# Patient Record
Sex: FEMALE | Race: BLACK OR AFRICAN AMERICAN | NOT HISPANIC OR LATINO | URBAN - METROPOLITAN AREA
[De-identification: names, ages, dates, MRNs, and addresses within clinical notes are randomized per-mention and may not be internally consistent; named-entity substitution may affect disease eponyms.]

---

## 2018-11-05 ENCOUNTER — OUTPATIENT (OUTPATIENT)
Dept: EMERGENCY DEPT | Facility: HOSPITAL | Age: 38
LOS: 1 days | End: 2018-11-05
Payer: COMMERCIAL

## 2018-11-05 VITALS
DIASTOLIC BLOOD PRESSURE: 63 MMHG | HEIGHT: 62.5 IN | SYSTOLIC BLOOD PRESSURE: 103 MMHG | WEIGHT: 143.96 LBS | RESPIRATION RATE: 20 BRPM | OXYGEN SATURATION: 100 % | TEMPERATURE: 98 F | HEART RATE: 88 BPM

## 2018-11-05 LAB
APPEARANCE UR: CLEAR — SIGNIFICANT CHANGE UP
BILIRUB UR-MCNC: NEGATIVE — SIGNIFICANT CHANGE UP
COLOR SPEC: YELLOW — SIGNIFICANT CHANGE UP
DIFF PNL FLD: ABNORMAL
GLUCOSE UR QL: NEGATIVE — SIGNIFICANT CHANGE UP
KETONES UR-MCNC: NEGATIVE — SIGNIFICANT CHANGE UP
LEUKOCYTE ESTERASE UR-ACNC: NEGATIVE — SIGNIFICANT CHANGE UP
NITRITE UR-MCNC: NEGATIVE — SIGNIFICANT CHANGE UP
PH UR: 6.5 — SIGNIFICANT CHANGE UP (ref 5–8)
PROT UR-MCNC: NEGATIVE MG/DL — SIGNIFICANT CHANGE UP
SP GR SPEC: <=1.005 — SIGNIFICANT CHANGE UP (ref 1–1.03)
UROBILINOGEN FLD QL: 0.2 E.U./DL — SIGNIFICANT CHANGE UP

## 2018-11-05 PROCEDURE — 81001 URINALYSIS AUTO W/SCOPE: CPT

## 2018-11-05 PROCEDURE — 96360 HYDRATION IV INFUSION INIT: CPT

## 2018-11-05 PROCEDURE — 99283 EMERGENCY DEPT VISIT LOW MDM: CPT

## 2018-11-05 PROCEDURE — 76805 OB US >/= 14 WKS SNGL FETUS: CPT

## 2018-11-05 PROCEDURE — 87661 TRICHOMONAS VAGINALIS AMPLIF: CPT

## 2018-11-05 PROCEDURE — 87086 URINE CULTURE/COLONY COUNT: CPT

## 2018-11-05 PROCEDURE — 76817 TRANSVAGINAL US OBSTETRIC: CPT

## 2018-11-05 PROCEDURE — 87798 DETECT AGENT NOS DNA AMP: CPT

## 2018-11-05 PROCEDURE — 94760 N-INVAS EAR/PLS OXIMETRY 1: CPT

## 2018-11-05 PROCEDURE — 87801 DETECT AGNT MULT DNA AMPLI: CPT

## 2018-11-05 RX ORDER — PROGESTERONE 200 MG/1
1 CAPSULE, LIQUID FILLED ORAL
Qty: 30 | Refills: 5 | OUTPATIENT
Start: 2018-11-05 | End: 2019-05-03

## 2018-11-05 RX ORDER — SODIUM CHLORIDE 9 MG/ML
1000 INJECTION, SOLUTION INTRAVENOUS ONCE
Qty: 0 | Refills: 0 | Status: DISCONTINUED | OUTPATIENT
Start: 2018-11-05 | End: 2018-11-05

## 2018-11-05 NOTE — ED ADULT TRIAGE NOTE - OTHER COMPLAINTS
, CC of abdominal cramps R, intermittently, (-) radiation , CC of abdominal cramps R since 0230H, intermittently, (-) radiation , CC of abdominal cramps R since 0230H, intermittently, (-) radiation, denies any vaginal discharge and still feels fetal movement.

## 2018-11-06 LAB
CULTURE RESULTS: SIGNIFICANT CHANGE UP
SPECIMEN SOURCE: SIGNIFICANT CHANGE UP

## 2018-11-09 LAB
A VAGINAE DNA VAG QL NAA+PROBE: ABNORMAL
BVAB2 DNA VAG QL NAA+PROBE: ABNORMAL
C ALBICANS DNA VAG QL NAA+PROBE: NEGATIVE — SIGNIFICANT CHANGE UP
C GLABRATA DNA VAG QL NAA+PROBE: NEGATIVE — SIGNIFICANT CHANGE UP
C KRUSEI DNA VAG QL NAA+PROBE: NEGATIVE — SIGNIFICANT CHANGE UP
C LUSITANIAE DNA VAG QL NAA+PROBE: NEGATIVE — SIGNIFICANT CHANGE UP
C PARAP DNA VAG QL NAA+PROBE: NEGATIVE — SIGNIFICANT CHANGE UP
C TROPICLS DNA VAG QL NAA+PROBE: NEGATIVE — SIGNIFICANT CHANGE UP
MEGA1 DNA VAG QL NAA+PROBE: ABNORMAL
T VAGINALIS RRNA SPEC QL NAA+PROBE: NEGATIVE — SIGNIFICANT CHANGE UP

## 2018-12-03 ENCOUNTER — OUTPATIENT (OUTPATIENT)
Dept: OUTPATIENT SERVICES | Facility: HOSPITAL | Age: 38
LOS: 1 days | End: 2018-12-03
Payer: COMMERCIAL

## 2018-12-03 DIAGNOSIS — Z3A.00 WEEKS OF GESTATION OF PREGNANCY NOT SPECIFIED: ICD-10-CM

## 2018-12-03 DIAGNOSIS — O26.899 OTHER SPECIFIED PREGNANCY RELATED CONDITIONS, UNSPECIFIED TRIMESTER: ICD-10-CM

## 2018-12-03 LAB
APPEARANCE UR: CLEAR — SIGNIFICANT CHANGE UP
BILIRUB UR-MCNC: NEGATIVE — SIGNIFICANT CHANGE UP
COLOR SPEC: YELLOW — SIGNIFICANT CHANGE UP
DIFF PNL FLD: ABNORMAL
GLUCOSE UR QL: NEGATIVE — SIGNIFICANT CHANGE UP
KETONES UR-MCNC: NEGATIVE — SIGNIFICANT CHANGE UP
LEUKOCYTE ESTERASE UR-ACNC: NEGATIVE — SIGNIFICANT CHANGE UP
NITRITE UR-MCNC: NEGATIVE — SIGNIFICANT CHANGE UP
PH UR: 6.5 — SIGNIFICANT CHANGE UP (ref 5–8)
PROT UR-MCNC: NEGATIVE MG/DL — SIGNIFICANT CHANGE UP
SP GR SPEC: 1.02 — SIGNIFICANT CHANGE UP (ref 1–1.03)
UROBILINOGEN FLD QL: 0.2 E.U./DL — SIGNIFICANT CHANGE UP

## 2018-12-03 PROCEDURE — 87801 DETECT AGNT MULT DNA AMPLI: CPT

## 2018-12-03 PROCEDURE — 81001 URINALYSIS AUTO W/SCOPE: CPT

## 2018-12-03 PROCEDURE — 96372 THER/PROPH/DIAG INJ SC/IM: CPT

## 2018-12-03 PROCEDURE — 87798 DETECT AGENT NOS DNA AMP: CPT

## 2018-12-03 PROCEDURE — 87591 N.GONORRHOEAE DNA AMP PROB: CPT

## 2018-12-03 PROCEDURE — 36415 COLL VENOUS BLD VENIPUNCTURE: CPT

## 2018-12-03 PROCEDURE — 87661 TRICHOMONAS VAGINALIS AMPLIF: CPT

## 2018-12-03 PROCEDURE — 59025 FETAL NON-STRESS TEST: CPT

## 2018-12-03 PROCEDURE — 87563 M. GENITALIUM AMP PROBE: CPT

## 2018-12-03 PROCEDURE — 99214 OFFICE O/P EST MOD 30 MIN: CPT

## 2018-12-03 PROCEDURE — 87086 URINE CULTURE/COLONY COUNT: CPT

## 2018-12-03 RX ADMIN — Medication 12 MILLIGRAM(S): at 10:35

## 2018-12-04 ENCOUNTER — OUTPATIENT (OUTPATIENT)
Dept: OUTPATIENT SERVICES | Facility: HOSPITAL | Age: 38
LOS: 1 days | End: 2018-12-04
Payer: COMMERCIAL

## 2018-12-04 DIAGNOSIS — O26.899 OTHER SPECIFIED PREGNANCY RELATED CONDITIONS, UNSPECIFIED TRIMESTER: ICD-10-CM

## 2018-12-04 DIAGNOSIS — Z3A.00 WEEKS OF GESTATION OF PREGNANCY NOT SPECIFIED: ICD-10-CM

## 2018-12-04 LAB
CULTURE RESULTS: SIGNIFICANT CHANGE UP
SPECIMEN SOURCE: SIGNIFICANT CHANGE UP

## 2018-12-04 PROCEDURE — 99214 OFFICE O/P EST MOD 30 MIN: CPT

## 2018-12-04 RX ADMIN — Medication 12 MILLIGRAM(S): at 10:20

## 2018-12-06 LAB
A VAGINAE DNA VAG QL NAA+PROBE: ABNORMAL
BVAB2 DNA VAG QL NAA+PROBE: ABNORMAL
C ALBICANS DNA VAG QL NAA+PROBE: NEGATIVE — SIGNIFICANT CHANGE UP
C GLABRATA DNA VAG QL NAA+PROBE: NEGATIVE — SIGNIFICANT CHANGE UP
M GENITALIUM DNA SPEC QL NAA+PROBE: NEGATIVE — SIGNIFICANT CHANGE UP
M HOMINIS DNA SPEC QL NAA+PROBE: NEGATIVE — SIGNIFICANT CHANGE UP
MEGA1 DNA VAG QL NAA+PROBE: ABNORMAL
T VAGINALIS RRNA SPEC QL NAA+PROBE: NEGATIVE — SIGNIFICANT CHANGE UP
UREAPLASMA DNA SPEC QL NAA+PROBE: POSITIVE
VAGINITIS PANEL COMMENT: SIGNIFICANT CHANGE UP

## 2019-02-06 ENCOUNTER — INPATIENT (INPATIENT)
Facility: HOSPITAL | Age: 39
LOS: 0 days | Discharge: HOME | DRG: 833 | End: 2019-02-07
Attending: OBSTETRICS & GYNECOLOGY | Admitting: OBSTETRICS & GYNECOLOGY
Payer: COMMERCIAL

## 2019-02-06 VITALS — HEIGHT: 62 IN | WEIGHT: 145.51 LBS

## 2019-02-06 DIAGNOSIS — Z3A.00 WEEKS OF GESTATION OF PREGNANCY NOT SPECIFIED: ICD-10-CM

## 2019-02-06 DIAGNOSIS — O26.899 OTHER SPECIFIED PREGNANCY RELATED CONDITIONS, UNSPECIFIED TRIMESTER: ICD-10-CM

## 2019-02-06 LAB
ALBUMIN SERPL ELPH-MCNC: 3.4 G/DL — SIGNIFICANT CHANGE UP (ref 3.3–5)
ALP SERPL-CCNC: 130 U/L — HIGH (ref 40–120)
ALT FLD-CCNC: 26 U/L — SIGNIFICANT CHANGE UP (ref 10–45)
ANION GAP SERPL CALC-SCNC: 10 MMOL/L — SIGNIFICANT CHANGE UP (ref 5–17)
APPEARANCE UR: CLEAR — SIGNIFICANT CHANGE UP
APTT BLD: 26.1 SEC — LOW (ref 27.5–36.3)
AST SERPL-CCNC: 21 U/L — SIGNIFICANT CHANGE UP (ref 10–40)
BACTERIA # UR AUTO: PRESENT /HPF
BASOPHILS # BLD AUTO: 0.03 K/UL — SIGNIFICANT CHANGE UP (ref 0–0.2)
BASOPHILS NFR BLD AUTO: 0.4 % — SIGNIFICANT CHANGE UP (ref 0–2)
BILIRUB SERPL-MCNC: 0.2 MG/DL — SIGNIFICANT CHANGE UP (ref 0.2–1.2)
BILIRUB UR-MCNC: NEGATIVE — SIGNIFICANT CHANGE UP
BLD GP AB SCN SERPL QL: NEGATIVE — SIGNIFICANT CHANGE UP
BUN SERPL-MCNC: 5 MG/DL — LOW (ref 7–23)
CALCIUM SERPL-MCNC: 9.6 MG/DL — SIGNIFICANT CHANGE UP (ref 8.4–10.5)
CHLORIDE SERPL-SCNC: 108 MMOL/L — SIGNIFICANT CHANGE UP (ref 96–108)
CO2 SERPL-SCNC: 22 MMOL/L — SIGNIFICANT CHANGE UP (ref 22–31)
COLOR SPEC: YELLOW — SIGNIFICANT CHANGE UP
CREAT ?TM UR-MCNC: 33 MG/DL — SIGNIFICANT CHANGE UP
CREAT SERPL-MCNC: 0.63 MG/DL — SIGNIFICANT CHANGE UP (ref 0.5–1.3)
DIFF PNL FLD: ABNORMAL
EOSINOPHIL # BLD AUTO: 0.05 K/UL — SIGNIFICANT CHANGE UP (ref 0–0.5)
EOSINOPHIL NFR BLD AUTO: 0.7 % — SIGNIFICANT CHANGE UP (ref 0–6)
EPI CELLS # UR: ABNORMAL /HPF (ref 0–5)
FIBRINOGEN PPP-MCNC: 525 MG/DL — HIGH (ref 258–438)
GLUCOSE SERPL-MCNC: 75 MG/DL — SIGNIFICANT CHANGE UP (ref 70–99)
GLUCOSE UR QL: NEGATIVE — SIGNIFICANT CHANGE UP
HCT VFR BLD CALC: 34.3 % — LOW (ref 34.5–45)
HGB BLD-MCNC: 11.5 G/DL — SIGNIFICANT CHANGE UP (ref 11.5–15.5)
IMM GRANULOCYTES NFR BLD AUTO: 0.3 % — SIGNIFICANT CHANGE UP (ref 0–1.5)
INR BLD: 0.97 — SIGNIFICANT CHANGE UP (ref 0.88–1.16)
KETONES UR-MCNC: NEGATIVE — SIGNIFICANT CHANGE UP
LDH SERPL L TO P-CCNC: 163 U/L — SIGNIFICANT CHANGE UP (ref 50–242)
LEUKOCYTE ESTERASE UR-ACNC: ABNORMAL
LYMPHOCYTES # BLD AUTO: 1.41 K/UL — SIGNIFICANT CHANGE UP (ref 1–3.3)
LYMPHOCYTES # BLD AUTO: 21.1 % — SIGNIFICANT CHANGE UP (ref 13–44)
MCHC RBC-ENTMCNC: 31.6 PG — SIGNIFICANT CHANGE UP (ref 27–34)
MCHC RBC-ENTMCNC: 33.5 GM/DL — SIGNIFICANT CHANGE UP (ref 32–36)
MCV RBC AUTO: 94.2 FL — SIGNIFICANT CHANGE UP (ref 80–100)
MONOCYTES # BLD AUTO: 1.01 K/UL — HIGH (ref 0–0.9)
MONOCYTES NFR BLD AUTO: 15.1 % — HIGH (ref 2–14)
NEUTROPHILS # BLD AUTO: 4.15 K/UL — SIGNIFICANT CHANGE UP (ref 1.8–7.4)
NEUTROPHILS NFR BLD AUTO: 62.4 % — SIGNIFICANT CHANGE UP (ref 43–77)
NITRITE UR-MCNC: NEGATIVE — SIGNIFICANT CHANGE UP
PH UR: 7 — SIGNIFICANT CHANGE UP (ref 5–8)
PLATELET # BLD AUTO: 185 K/UL — SIGNIFICANT CHANGE UP (ref 150–400)
POTASSIUM SERPL-MCNC: 4.4 MMOL/L — SIGNIFICANT CHANGE UP (ref 3.5–5.3)
POTASSIUM SERPL-SCNC: 4.4 MMOL/L — SIGNIFICANT CHANGE UP (ref 3.5–5.3)
PROT ?TM UR-MCNC: 7 MG/DL — SIGNIFICANT CHANGE UP (ref 0–12)
PROT SERPL-MCNC: 6.7 G/DL — SIGNIFICANT CHANGE UP (ref 6–8.3)
PROT UR-MCNC: NEGATIVE MG/DL — SIGNIFICANT CHANGE UP
PROT/CREAT UR-RTO: 0.2 RATIO — SIGNIFICANT CHANGE UP (ref 0–0.2)
PROTHROM AB SERPL-ACNC: 10.9 SEC — SIGNIFICANT CHANGE UP (ref 10–12.9)
RBC # BLD: 3.64 M/UL — LOW (ref 3.8–5.2)
RBC # FLD: 13.4 % — SIGNIFICANT CHANGE UP (ref 10.3–14.5)
RBC CASTS # UR COMP ASSIST: < 5 /HPF — SIGNIFICANT CHANGE UP
RH IG SCN BLD-IMP: POSITIVE — SIGNIFICANT CHANGE UP
RH IG SCN BLD-IMP: POSITIVE — SIGNIFICANT CHANGE UP
SODIUM SERPL-SCNC: 140 MMOL/L — SIGNIFICANT CHANGE UP (ref 135–145)
SP GR SPEC: 1.01 — SIGNIFICANT CHANGE UP (ref 1–1.03)
URATE SERPL-MCNC: 3.2 MG/DL — SIGNIFICANT CHANGE UP (ref 2.5–7)
UROBILINOGEN FLD QL: 0.2 E.U./DL — SIGNIFICANT CHANGE UP
WBC # BLD: 6.67 K/UL — SIGNIFICANT CHANGE UP (ref 3.8–10.5)
WBC # FLD AUTO: 6.67 K/UL — SIGNIFICANT CHANGE UP (ref 3.8–10.5)
WBC UR QL: > 10 /HPF

## 2019-02-06 RX ORDER — DOCUSATE SODIUM 100 MG
100 CAPSULE ORAL THREE TIMES A DAY
Qty: 0 | Refills: 0 | Status: DISCONTINUED | OUTPATIENT
Start: 2019-02-06 | End: 2019-02-07

## 2019-02-06 RX ORDER — PROGESTERONE 200 MG/1
200 CAPSULE, LIQUID FILLED ORAL AT BEDTIME
Qty: 0 | Refills: 0 | Status: DISCONTINUED | OUTPATIENT
Start: 2019-02-06 | End: 2019-02-07

## 2019-02-07 VITALS
TEMPERATURE: 97 F | DIASTOLIC BLOOD PRESSURE: 58 MMHG | SYSTOLIC BLOOD PRESSURE: 100 MMHG | HEART RATE: 75 BPM | OXYGEN SATURATION: 100 % | RESPIRATION RATE: 18 BRPM

## 2019-02-07 LAB
COLLECT DURATION TIME UR: 24 HR — SIGNIFICANT CHANGE UP
PROT 24H UR-MRATE: 165 MG/24 H — HIGH (ref 50–100)
T PALLIDUM AB TITR SER: NEGATIVE — SIGNIFICANT CHANGE UP
TOTAL VOLUME - 24 HOUR: 3300 ML — SIGNIFICANT CHANGE UP
URINE CREATININE CALCULATION: 1.4 G/24 H — SIGNIFICANT CHANGE UP (ref 0.8–1.8)
URINE CREATININE CALCULATION: 1.4 G/24 H — SIGNIFICANT CHANGE UP (ref 0.8–1.8)

## 2019-02-07 PROCEDURE — 84550 ASSAY OF BLOOD/URIC ACID: CPT

## 2019-02-07 PROCEDURE — 83615 LACTATE (LD) (LDH) ENZYME: CPT

## 2019-02-07 PROCEDURE — 81001 URINALYSIS AUTO W/SCOPE: CPT

## 2019-02-07 PROCEDURE — 86901 BLOOD TYPING SEROLOGIC RH(D): CPT

## 2019-02-07 PROCEDURE — 99214 OFFICE O/P EST MOD 30 MIN: CPT

## 2019-02-07 PROCEDURE — 84156 ASSAY OF PROTEIN URINE: CPT

## 2019-02-07 PROCEDURE — 85025 COMPLETE CBC W/AUTO DIFF WBC: CPT

## 2019-02-07 PROCEDURE — 87186 SC STD MICRODIL/AGAR DIL: CPT

## 2019-02-07 PROCEDURE — 87070 CULTURE OTHR SPECIMN AEROBIC: CPT

## 2019-02-07 PROCEDURE — 82570 ASSAY OF URINE CREATININE: CPT

## 2019-02-07 PROCEDURE — 80053 COMPREHEN METABOLIC PANEL: CPT

## 2019-02-07 PROCEDURE — 86900 BLOOD TYPING SEROLOGIC ABO: CPT

## 2019-02-07 PROCEDURE — 85730 THROMBOPLASTIN TIME PARTIAL: CPT

## 2019-02-07 PROCEDURE — 85384 FIBRINOGEN ACTIVITY: CPT

## 2019-02-07 PROCEDURE — 87086 URINE CULTURE/COLONY COUNT: CPT

## 2019-02-07 PROCEDURE — 85610 PROTHROMBIN TIME: CPT

## 2019-02-07 PROCEDURE — 86780 TREPONEMA PALLIDUM: CPT

## 2019-02-07 PROCEDURE — 86850 RBC ANTIBODY SCREEN: CPT

## 2019-02-07 RX ORDER — SODIUM CHLORIDE 9 MG/ML
500 INJECTION, SOLUTION INTRAVENOUS ONCE
Qty: 0 | Refills: 0 | Status: DISCONTINUED | OUTPATIENT
Start: 2019-02-07 | End: 2019-02-07

## 2019-02-07 RX ORDER — SODIUM CHLORIDE 9 MG/ML
1000 INJECTION, SOLUTION INTRAVENOUS
Qty: 0 | Refills: 0 | Status: DISCONTINUED | OUTPATIENT
Start: 2019-02-07 | End: 2019-02-07

## 2019-02-07 RX ORDER — CALCIUM CARBONATE 500(1250)
1 TABLET ORAL EVERY 8 HOURS
Qty: 0 | Refills: 0 | Status: DISCONTINUED | OUTPATIENT
Start: 2019-02-07 | End: 2019-02-07

## 2019-02-07 RX ADMIN — Medication 1 TABLET(S): at 12:15

## 2019-02-07 RX ADMIN — Medication 100 MILLIGRAM(S): at 12:15

## 2019-02-07 RX ADMIN — SODIUM CHLORIDE 125 MILLILITER(S): 9 INJECTION, SOLUTION INTRAVENOUS at 12:31

## 2019-02-07 NOTE — DISCHARGE NOTE ANTEPARTUM - PLAN OF CARE
Follow up with Dr. Peres at [facility] twice weekly, Monday & Thursday Follow up with Dr. Peres at [facility] twice weekly, Monday & Thursday

## 2019-02-07 NOTE — DISCHARGE NOTE ANTEPARTUM - PATIENT PORTAL LINK FT
You can access the [facility] Patient Portal, offered by [facility], by registering with the following website: [email or web address]

## 2019-02-07 NOTE — DISCHARGE NOTE ANTEPARTUM - CARE PROVIDER_API CALL
Baltazar Rees)  Obstetrics and Gynecology  [address] [place] [address]  Phone: [phone]  Fax: [phone]  Follow Up Time:

## 2019-02-09 LAB
-  AMPICILLIN/SULBACTAM: SIGNIFICANT CHANGE UP
-  CEFAZOLIN: SIGNIFICANT CHANGE UP
-  CEFTRIAXONE: SIGNIFICANT CHANGE UP
-  CIPROFLOXACIN: SIGNIFICANT CHANGE UP
-  DAPTOMYCIN: SIGNIFICANT CHANGE UP
-  GENTAMICIN: SIGNIFICANT CHANGE UP
-  LEVOFLOXACIN: SIGNIFICANT CHANGE UP
-  LINEZOLID: SIGNIFICANT CHANGE UP
-  NITROFURANTOIN: SIGNIFICANT CHANGE UP
-  OXACILLIN: SIGNIFICANT CHANGE UP
-  PENICILLIN: SIGNIFICANT CHANGE UP
-  RIFAMPIN: SIGNIFICANT CHANGE UP
-  TETRACYCLINE: SIGNIFICANT CHANGE UP
-  TRIMETHOPRIM/SULFAMETHOXAZOLE: SIGNIFICANT CHANGE UP
-  VANCOMYCIN: SIGNIFICANT CHANGE UP
CULTURE RESULTS: SIGNIFICANT CHANGE UP
CULTURE RESULTS: SIGNIFICANT CHANGE UP
GRAM STN FLD: SIGNIFICANT CHANGE UP
METHOD TYPE: SIGNIFICANT CHANGE UP
ORGANISM # SPEC MICROSCOPIC CNT: SIGNIFICANT CHANGE UP
ORGANISM # SPEC MICROSCOPIC CNT: SIGNIFICANT CHANGE UP
SPECIMEN SOURCE: SIGNIFICANT CHANGE UP
SPECIMEN SOURCE: SIGNIFICANT CHANGE UP

## 2019-02-11 DIAGNOSIS — O35.8XX0 MATERNAL CARE FOR OTHER (SUSPECTED) FETAL ABNORMALITY AND DAMAGE, NOT APPLICABLE OR UNSPECIFIED: ICD-10-CM

## 2019-02-18 ENCOUNTER — OUTPATIENT (OUTPATIENT)
Dept: OUTPATIENT SERVICES | Facility: HOSPITAL | Age: 39
LOS: 1 days | End: 2019-02-18
Payer: COMMERCIAL

## 2019-02-18 DIAGNOSIS — Z3A.00 WEEKS OF GESTATION OF PREGNANCY NOT SPECIFIED: ICD-10-CM

## 2019-02-18 DIAGNOSIS — O26.899 OTHER SPECIFIED PREGNANCY RELATED CONDITIONS, UNSPECIFIED TRIMESTER: ICD-10-CM

## 2019-02-18 PROBLEM — Z00.00 ENCOUNTER FOR PREVENTIVE HEALTH EXAMINATION: Status: ACTIVE | Noted: 2019-02-18

## 2019-02-18 PROCEDURE — 99214 OFFICE O/P EST MOD 30 MIN: CPT

## 2019-02-18 PROCEDURE — 36415 COLL VENOUS BLD VENIPUNCTURE: CPT

## 2019-02-18 PROCEDURE — 96365 THER/PROPH/DIAG IV INF INIT: CPT

## 2019-02-18 PROCEDURE — 76819 FETAL BIOPHYS PROFIL W/O NST: CPT

## 2019-02-18 PROCEDURE — 94760 N-INVAS EAR/PLS OXIMETRY 1: CPT

## 2019-02-19 DIAGNOSIS — O09.523 SUPERVISION OF ELDERLY MULTIGRAVIDA, THIRD TRIMESTER: ICD-10-CM

## 2019-02-25 ENCOUNTER — INPATIENT (INPATIENT)
Facility: HOSPITAL | Age: 39
LOS: 3 days | Discharge: HOME | End: 2019-03-01
Attending: OBSTETRICS & GYNECOLOGY | Admitting: OBSTETRICS & GYNECOLOGY
Payer: COMMERCIAL

## 2019-02-25 ENCOUNTER — RESULT REVIEW (OUTPATIENT)
Age: 39
End: 2019-02-25

## 2019-02-25 ENCOUNTER — TRANSCRIPTION ENCOUNTER (OUTPATIENT)
Age: 39
End: 2019-02-25

## 2019-02-25 VITALS — WEIGHT: 149.91 LBS | HEIGHT: 62 IN

## 2019-02-25 DIAGNOSIS — Z3A.00 WEEKS OF GESTATION OF PREGNANCY NOT SPECIFIED: ICD-10-CM

## 2019-02-25 DIAGNOSIS — O26.899 OTHER SPECIFIED PREGNANCY RELATED CONDITIONS, UNSPECIFIED TRIMESTER: ICD-10-CM

## 2019-02-25 LAB
ALBUMIN SERPL ELPH-MCNC: 3.5 G/DL — SIGNIFICANT CHANGE UP (ref 3.3–5)
ALP SERPL-CCNC: 144 U/L — HIGH (ref 40–120)
ALT FLD-CCNC: 19 U/L — SIGNIFICANT CHANGE UP (ref 10–45)
ANION GAP SERPL CALC-SCNC: 9 MMOL/L — SIGNIFICANT CHANGE UP (ref 5–17)
APPEARANCE UR: CLEAR — SIGNIFICANT CHANGE UP
APTT BLD: 26.5 SEC — LOW (ref 27.5–36.3)
AST SERPL-CCNC: 14 U/L — SIGNIFICANT CHANGE UP (ref 10–40)
BASOPHILS # BLD AUTO: 0.03 K/UL — SIGNIFICANT CHANGE UP (ref 0–0.2)
BASOPHILS NFR BLD AUTO: 0.3 % — SIGNIFICANT CHANGE UP (ref 0–2)
BILIRUB SERPL-MCNC: 0.2 MG/DL — SIGNIFICANT CHANGE UP (ref 0.2–1.2)
BILIRUB UR-MCNC: NEGATIVE — SIGNIFICANT CHANGE UP
BUN SERPL-MCNC: 7 MG/DL — SIGNIFICANT CHANGE UP (ref 7–23)
CALCIUM SERPL-MCNC: 9.2 MG/DL — SIGNIFICANT CHANGE UP (ref 8.4–10.5)
CHLORIDE SERPL-SCNC: 107 MMOL/L — SIGNIFICANT CHANGE UP (ref 96–108)
CO2 SERPL-SCNC: 21 MMOL/L — LOW (ref 22–31)
COLOR SPEC: YELLOW — SIGNIFICANT CHANGE UP
CREAT ?TM UR-MCNC: 30 MG/DL — SIGNIFICANT CHANGE UP
CREAT SERPL-MCNC: 0.68 MG/DL — SIGNIFICANT CHANGE UP (ref 0.5–1.3)
DIFF PNL FLD: ABNORMAL
EOSINOPHIL # BLD AUTO: 0.05 K/UL — SIGNIFICANT CHANGE UP (ref 0–0.5)
EOSINOPHIL NFR BLD AUTO: 0.6 % — SIGNIFICANT CHANGE UP (ref 0–6)
FIBRINOGEN PPP-MCNC: 458 MG/DL — HIGH (ref 258–438)
GLUCOSE SERPL-MCNC: 92 MG/DL — SIGNIFICANT CHANGE UP (ref 70–99)
GLUCOSE UR QL: NEGATIVE — SIGNIFICANT CHANGE UP
HCT VFR BLD CALC: 36.6 % — SIGNIFICANT CHANGE UP (ref 34.5–45)
HGB BLD-MCNC: 12 G/DL — SIGNIFICANT CHANGE UP (ref 11.5–15.5)
IMM GRANULOCYTES NFR BLD AUTO: 0.3 % — SIGNIFICANT CHANGE UP (ref 0–1.5)
INR BLD: 0.93 — SIGNIFICANT CHANGE UP (ref 0.88–1.16)
KETONES UR-MCNC: NEGATIVE — SIGNIFICANT CHANGE UP
LDH SERPL L TO P-CCNC: 164 U/L — SIGNIFICANT CHANGE UP (ref 50–242)
LEUKOCYTE ESTERASE UR-ACNC: ABNORMAL
LYMPHOCYTES # BLD AUTO: 1.69 K/UL — SIGNIFICANT CHANGE UP (ref 1–3.3)
LYMPHOCYTES # BLD AUTO: 19.5 % — SIGNIFICANT CHANGE UP (ref 13–44)
MCHC RBC-ENTMCNC: 31.2 PG — SIGNIFICANT CHANGE UP (ref 27–34)
MCHC RBC-ENTMCNC: 32.8 GM/DL — SIGNIFICANT CHANGE UP (ref 32–36)
MCV RBC AUTO: 95.1 FL — SIGNIFICANT CHANGE UP (ref 80–100)
MONOCYTES # BLD AUTO: 0.92 K/UL — HIGH (ref 0–0.9)
MONOCYTES NFR BLD AUTO: 10.6 % — SIGNIFICANT CHANGE UP (ref 2–14)
NEUTROPHILS # BLD AUTO: 5.93 K/UL — SIGNIFICANT CHANGE UP (ref 1.8–7.4)
NEUTROPHILS NFR BLD AUTO: 68.7 % — SIGNIFICANT CHANGE UP (ref 43–77)
NITRITE UR-MCNC: NEGATIVE — SIGNIFICANT CHANGE UP
NRBC # BLD: 0 /100 WBCS — SIGNIFICANT CHANGE UP (ref 0–0)
PH UR: 7 — SIGNIFICANT CHANGE UP (ref 5–8)
PLATELET # BLD AUTO: 204 K/UL — SIGNIFICANT CHANGE UP (ref 150–400)
POTASSIUM SERPL-MCNC: 3.9 MMOL/L — SIGNIFICANT CHANGE UP (ref 3.5–5.3)
POTASSIUM SERPL-SCNC: 3.9 MMOL/L — SIGNIFICANT CHANGE UP (ref 3.5–5.3)
PROT ?TM UR-MCNC: <4 MG/DL — SIGNIFICANT CHANGE UP (ref 0–12)
PROT SERPL-MCNC: 7.1 G/DL — SIGNIFICANT CHANGE UP (ref 6–8.3)
PROT UR-MCNC: NEGATIVE MG/DL — SIGNIFICANT CHANGE UP
PROT/CREAT UR-RTO: <0.1 RATIO — SIGNIFICANT CHANGE UP (ref 0–0.2)
PROTHROM AB SERPL-ACNC: 10.5 SEC — SIGNIFICANT CHANGE UP (ref 10–12.9)
RBC # BLD: 3.85 M/UL — SIGNIFICANT CHANGE UP (ref 3.8–5.2)
RBC # FLD: 13.9 % — SIGNIFICANT CHANGE UP (ref 10.3–14.5)
SODIUM SERPL-SCNC: 137 MMOL/L — SIGNIFICANT CHANGE UP (ref 135–145)
SP GR SPEC: <=1.005 — SIGNIFICANT CHANGE UP (ref 1–1.03)
URATE SERPL-MCNC: 3.1 MG/DL — SIGNIFICANT CHANGE UP (ref 2.5–7)
UROBILINOGEN FLD QL: 0.2 E.U./DL — SIGNIFICANT CHANGE UP
WBC # BLD: 8.65 K/UL — SIGNIFICANT CHANGE UP (ref 3.8–10.5)
WBC # FLD AUTO: 8.65 K/UL — SIGNIFICANT CHANGE UP (ref 3.8–10.5)

## 2019-02-25 RX ORDER — OXYTOCIN 10 UNIT/ML
333.33 VIAL (ML) INJECTION
Qty: 20 | Refills: 0 | Status: DISCONTINUED | OUTPATIENT
Start: 2019-02-25 | End: 2019-02-25

## 2019-02-25 RX ORDER — DIPHENHYDRAMINE HCL 50 MG
25 CAPSULE ORAL EVERY 6 HOURS
Qty: 0 | Refills: 0 | Status: DISCONTINUED | OUTPATIENT
Start: 2019-02-26 | End: 2019-03-01

## 2019-02-25 RX ORDER — GLYCERIN ADULT
1 SUPPOSITORY, RECTAL RECTAL AT BEDTIME
Qty: 0 | Refills: 0 | Status: DISCONTINUED | OUTPATIENT
Start: 2019-02-26 | End: 2019-03-01

## 2019-02-25 RX ORDER — FENTANYL/BUPIVACAINE/NS/PF 2MCG/ML-.1
250 PLASTIC BAG, INJECTION (ML) INJECTION
Qty: 0 | Refills: 0 | Status: DISCONTINUED | OUTPATIENT
Start: 2019-02-25 | End: 2019-02-25

## 2019-02-25 RX ORDER — DOCUSATE SODIUM 100 MG
100 CAPSULE ORAL
Qty: 0 | Refills: 0 | Status: DISCONTINUED | OUTPATIENT
Start: 2019-02-26 | End: 2019-03-01

## 2019-02-25 RX ORDER — CITRIC ACID/SODIUM CITRATE 300-500 MG
30 SOLUTION, ORAL ORAL ONCE
Qty: 0 | Refills: 0 | Status: COMPLETED | OUTPATIENT
Start: 2019-02-25 | End: 2019-02-25

## 2019-02-25 RX ORDER — FERROUS SULFATE 325(65) MG
325 TABLET ORAL DAILY
Qty: 0 | Refills: 0 | Status: DISCONTINUED | OUTPATIENT
Start: 2019-02-26 | End: 2019-03-01

## 2019-02-25 RX ORDER — SODIUM CHLORIDE 9 MG/ML
1000 INJECTION, SOLUTION INTRAVENOUS ONCE
Qty: 0 | Refills: 0 | Status: COMPLETED | OUTPATIENT
Start: 2019-02-25 | End: 2019-02-25

## 2019-02-25 RX ORDER — SODIUM CHLORIDE 9 MG/ML
1000 INJECTION, SOLUTION INTRAVENOUS
Qty: 0 | Refills: 0 | Status: DISCONTINUED | OUTPATIENT
Start: 2019-02-25 | End: 2019-02-25

## 2019-02-25 RX ORDER — AZITHROMYCIN 500 MG/1
500 TABLET, FILM COATED ORAL ONCE
Qty: 0 | Refills: 0 | Status: COMPLETED | OUTPATIENT
Start: 2019-02-25 | End: 2019-02-25

## 2019-02-25 RX ORDER — NALOXONE HYDROCHLORIDE 4 MG/.1ML
0.1 SPRAY NASAL
Qty: 0 | Refills: 0 | Status: DISCONTINUED | OUTPATIENT
Start: 2019-02-25 | End: 2019-02-26

## 2019-02-25 RX ORDER — OXYTOCIN 10 UNIT/ML
1 VIAL (ML) INJECTION
Qty: 30 | Refills: 0 | Status: DISCONTINUED | OUTPATIENT
Start: 2019-02-25 | End: 2019-03-01

## 2019-02-25 RX ORDER — CEFAZOLIN SODIUM 1 G
2000 VIAL (EA) INJECTION ONCE
Qty: 0 | Refills: 0 | Status: COMPLETED | OUTPATIENT
Start: 2019-02-25 | End: 2019-02-25

## 2019-02-25 RX ORDER — OXYCODONE AND ACETAMINOPHEN 5; 325 MG/1; MG/1
1 TABLET ORAL
Qty: 0 | Refills: 0 | Status: DISCONTINUED | OUTPATIENT
Start: 2019-02-26 | End: 2019-03-01

## 2019-02-25 RX ORDER — IBUPROFEN 200 MG
600 TABLET ORAL EVERY 6 HOURS
Qty: 0 | Refills: 0 | Status: DISCONTINUED | OUTPATIENT
Start: 2019-02-26 | End: 2019-03-01

## 2019-02-25 RX ORDER — ONDANSETRON 8 MG/1
4 TABLET, FILM COATED ORAL EVERY 6 HOURS
Qty: 0 | Refills: 0 | Status: DISCONTINUED | OUTPATIENT
Start: 2019-02-25 | End: 2019-02-26

## 2019-02-25 RX ORDER — LANOLIN
1 OINTMENT (GRAM) TOPICAL
Qty: 0 | Refills: 0 | Status: DISCONTINUED | OUTPATIENT
Start: 2019-02-26 | End: 2019-03-01

## 2019-02-25 RX ORDER — NALBUPHINE HYDROCHLORIDE 10 MG/ML
3 INJECTION, SOLUTION INTRAMUSCULAR; INTRAVENOUS; SUBCUTANEOUS EVERY 6 HOURS
Qty: 0 | Refills: 0 | Status: DISCONTINUED | OUTPATIENT
Start: 2019-02-25 | End: 2019-02-26

## 2019-02-25 RX ORDER — SIMETHICONE 80 MG/1
80 TABLET, CHEWABLE ORAL EVERY 4 HOURS
Qty: 0 | Refills: 0 | Status: DISCONTINUED | OUTPATIENT
Start: 2019-02-26 | End: 2019-03-01

## 2019-02-25 RX ORDER — SODIUM CHLORIDE 9 MG/ML
1000 INJECTION, SOLUTION INTRAVENOUS
Qty: 0 | Refills: 0 | Status: DISCONTINUED | OUTPATIENT
Start: 2019-02-25 | End: 2019-02-26

## 2019-02-25 RX ORDER — OXYCODONE AND ACETAMINOPHEN 5; 325 MG/1; MG/1
2 TABLET ORAL EVERY 6 HOURS
Qty: 0 | Refills: 0 | Status: DISCONTINUED | OUTPATIENT
Start: 2019-02-26 | End: 2019-03-01

## 2019-02-25 RX ORDER — OXYTOCIN 10 UNIT/ML
41.67 VIAL (ML) INJECTION
Qty: 20 | Refills: 0 | Status: DISCONTINUED | OUTPATIENT
Start: 2019-02-25 | End: 2019-02-26

## 2019-02-25 RX ORDER — SODIUM CHLORIDE 9 MG/ML
1000 INJECTION, SOLUTION INTRAVENOUS
Qty: 0 | Refills: 0 | Status: DISCONTINUED | OUTPATIENT
Start: 2019-02-26 | End: 2019-03-01

## 2019-02-25 RX ORDER — OXYTOCIN 10 UNIT/ML
10 VIAL (ML) INJECTION
Qty: 30 | Refills: 0 | Status: DISCONTINUED | OUTPATIENT
Start: 2019-02-25 | End: 2019-03-01

## 2019-02-25 RX ORDER — TETANUS TOXOID, REDUCED DIPHTHERIA TOXOID AND ACELLULAR PERTUSSIS VACCINE, ADSORBED 5; 2.5; 8; 8; 2.5 [IU]/.5ML; [IU]/.5ML; UG/.5ML; UG/.5ML; UG/.5ML
0.5 SUSPENSION INTRAMUSCULAR ONCE
Qty: 0 | Refills: 0 | Status: DISCONTINUED | OUTPATIENT
Start: 2019-02-26 | End: 2019-03-01

## 2019-02-25 RX ORDER — OXYTOCIN 10 UNIT/ML
41.67 VIAL (ML) INJECTION
Qty: 20 | Refills: 0 | Status: DISCONTINUED | OUTPATIENT
Start: 2019-02-26 | End: 2019-03-01

## 2019-02-25 RX ORDER — OXYTOCIN 10 UNIT/ML
333.33 VIAL (ML) INJECTION
Qty: 20 | Refills: 0 | Status: DISCONTINUED | OUTPATIENT
Start: 2019-02-25 | End: 2019-03-01

## 2019-02-25 RX ADMIN — SODIUM CHLORIDE 125 MILLILITER(S): 9 INJECTION, SOLUTION INTRAVENOUS at 22:23

## 2019-02-25 RX ADMIN — Medication 250 MILLILITER(S): at 18:25

## 2019-02-25 RX ADMIN — AZITHROMYCIN 255 MILLIGRAM(S): 500 TABLET, FILM COATED ORAL at 22:23

## 2019-02-25 RX ADMIN — Medication 1 MILLIUNIT(S)/MIN: at 18:25

## 2019-02-25 RX ADMIN — SODIUM CHLORIDE 125 MILLILITER(S): 9 INJECTION, SOLUTION INTRAVENOUS at 19:37

## 2019-02-25 RX ADMIN — Medication 30 MILLILITER(S): at 22:24

## 2019-02-25 RX ADMIN — SODIUM CHLORIDE 2000 MILLILITER(S): 9 INJECTION, SOLUTION INTRAVENOUS at 16:14

## 2019-02-25 RX ADMIN — Medication 100 MILLIGRAM(S): at 22:23

## 2019-02-25 RX ADMIN — SODIUM CHLORIDE 125 MILLILITER(S): 9 INJECTION, SOLUTION INTRAVENOUS at 18:25

## 2019-02-25 NOTE — DISCHARGE NOTE OB - CARE PLAN
Goal:	home  Assessment and plan of treatment:	Nothing per  vagina  for  six weeks  no sex  no  tub  baths no  swimming Principal Discharge DX:	Postpartum state  Goal:	home  Assessment and plan of treatment:	Nothing per  vagina  for  six weeks  no sex  no  tub  baths no  swimming

## 2019-02-25 NOTE — PROGRESS NOTE ADULT - SUBJECTIVE AND OBJECTIVE BOX
Section Operative Note      Pre-Op Diagnosis:No  reassuring  fetal  testing  ,  inadequate  fetal  growth,  suspected  placental  insufficiency.  Non reassuring fetal  heart  traciing remote  from  delivery ,  arrest  of  dilation      Post-Op Diagnosis:same      Time Out: 	[x ] Performed		[ ]Not Performed:  Procedure: 	 Section: 	[ ] Repeat 	#_______	[ ] Primary         [ ]Emergency	        [ ]Other____________:  Uterine incision:         [ x]Low Transverse C/S	[ ]Low Vertical C/S           [ ]Classical C/S							  Additional Procedures: 	[ ] Bilateral Tubal Ligation.  Type:______________  Other:	[ ]Lysis of Adhesions   	[ ]C-Hysterectomy          [ ]Other__________________:  Surgeon:  Dr. Rees                                                   .	Assist:   ____________Dr arreguin_______.                                                                             .   Anesthesia: ____Dr PATEL __________________________	Type: [ ]Epidural  [ ] Spinal   [ ] General	[ ]Other:  IV Fluids:IVRL  1000CC 20  U  PTIOCIN  2 G  ANCEF, 500MG  ZITHROMAC					Urine Output:	100CC CLEAR	Soria:  [X ] Yes [ ]No [ ] Other:  EBL:    	900CC	    Delivery findings:    [X ] Live 	[ ]Non-Viable: 	[X ]MALE   [ ]FEMALE, Infant. APGAR    9          AND   9              .  [X ] Peds at delivery.  [ ]MULTIPLE GESTATION -NOTES:      POSITION:     VTC                   PRESENTATION          OP                      .  DELIVERED BY:  	[ X]HEAD 		[ ]BREECH 	[ ]OTHER:  		[ X]MANUAL 		[ ]VACUUM	[ ] OTHER:	  PLACENTA: 	[x ]Removed	[ x]Uterus explored		[x ]Empty		[ ]Other 		  		UTERUS:  	[ x]Normal		[ ]Fibroids		[ ] Other:  ADNEXA: 	[ x]Right Normal	[ ]Other:  	[x ]Left Normal	[ ]Other :  PELVIS:	[ x]Normal		[ ]Adhesions [ ]Mild  [ ]Moderate [ ]Severe  Procedure:  	Patient in supine position.    Skin Incision	[x ]Pfannenstiel	[ ]Other:			[ ]Old scar  removal.  		Fascial Incision	[ x]Transverse 	[ ]Other:		  Peritoneal incision	[x ]Performed	[ x]Vertical  [ ]Other:		[ ]Lysis of Adhesions  		Bladder Flap	[ ]Created	[ ]Not Created  		Uterine Incision	[x ]Low transverse	[ ]Low Vertical	[ ]Classical  [ ]Other:   	Uterine Repair	[ x]_#_1_______Layers-Using__1 chromic______________ sutures 	[x ] hemostasis  excellent.  				[ ]Other:                       .                      Abdominal Cavity	[x ]Cleared of clots and debris  Rectus  Muscles  	Reapproximated [x ]Yes Using__1 chromic______________ sutures. [ ]Not Re- Approximated.  Fascial Reapproximation 	[ x]UsingUsing___1 Vicril_____________ sutures [ ]Other:                                 .	  Subcutaneous Tissue 	[x ]Irrigated  and hemostasis assured:[x ]Reapproximated Using____2-0____________ sutures.  Skin Reapproximation:	[x ]Subcuticular Using________________ sutures [x ] Insorb Staples   [ ]Skin Staples [ ]Other:  Dressing applied  and Patient to RR in  [x ] Stable [ ] Other ;                         condition.	  End of Operation;	[x ] Sponge/lap/needle count correct.  [ ] Not correct.  [ ]Not Done [ ]X-ray=Clear  Pathology:	[ x]Placenta.   	[ ]Fallopian Tube Portions [ ]Right [ ]Left.	[ ]Other:                                           	Complications/Attending’s Notes:	[X ] None.  	[ ] Other:                                                                        [ ]CONTINUATION OF NOTES NEXT PAGE:

## 2019-02-25 NOTE — DISCHARGE NOTE OB - CARE PROVIDER_API CALL
Baltazar Rees)  Obstetrics and Gynecology  [address] [place] [address]  Phone: [phone]  Fax: [phone]  Follow Up Time:

## 2019-02-25 NOTE — DISCHARGE NOTE OB - PATIENT PORTAL LINK FT
You can access the [facility] Patient Portal, offered by [facility], by registering with the following website: [email or web address]

## 2019-02-26 RX ORDER — ACETAMINOPHEN 500 MG
650 TABLET ORAL EVERY 6 HOURS
Qty: 0 | Refills: 0 | Status: DISCONTINUED | OUTPATIENT
Start: 2019-02-26 | End: 2019-03-01

## 2019-02-26 RX ADMIN — Medication 25 MILLIGRAM(S): at 23:38

## 2019-02-26 RX ADMIN — Medication 600 MILLIGRAM(S): at 18:45

## 2019-02-26 RX ADMIN — Medication 650 MILLIGRAM(S): at 16:40

## 2019-02-26 RX ADMIN — Medication 600 MILLIGRAM(S): at 12:00

## 2019-02-26 RX ADMIN — Medication 650 MILLIGRAM(S): at 09:54

## 2019-02-26 RX ADMIN — Medication 325 MILLIGRAM(S): at 12:00

## 2019-02-26 RX ADMIN — Medication 650 MILLIGRAM(S): at 15:43

## 2019-02-26 RX ADMIN — Medication 650 MILLIGRAM(S): at 10:40

## 2019-02-26 RX ADMIN — Medication 25 MILLIGRAM(S): at 18:00

## 2019-02-26 RX ADMIN — Medication 650 MILLIGRAM(S): at 23:39

## 2019-02-26 RX ADMIN — Medication 1 TABLET(S): at 12:00

## 2019-02-26 RX ADMIN — Medication 600 MILLIGRAM(S): at 17:57

## 2019-02-26 RX ADMIN — Medication 100 MILLIGRAM(S): at 12:00

## 2019-02-26 RX ADMIN — Medication 600 MILLIGRAM(S): at 06:00

## 2019-02-26 RX ADMIN — Medication 600 MILLIGRAM(S): at 06:42

## 2019-02-26 RX ADMIN — Medication 600 MILLIGRAM(S): at 12:50

## 2019-02-26 NOTE — PROGRESS NOTE ADULT - ASSESSMENT
38y Female POD# 1   s/p C/S, Uncomplicated                                   1. Neuro/Pain:  OPM  2  CV:  VS per routine  3. Pulm: Encourage ISS & Ambulation  4. GI:  Reg  5. : TOV today  6. DVT ppx: SCDs, SQH 5000 mg BID  7. Dispo: POD #3 or #4

## 2019-02-26 NOTE — PROGRESS NOTE ADULT - SUBJECTIVE AND OBJECTIVE BOX
Patient evaluated at bedside.   She reports pain is well controlled.  Soria in place  +Flatus  Not OOB yet.    She denies HA, dizziness, CP, palpitations, SOB, n/v, or heavy vaginal bleeding.    Physical Exam:  Vital Signs Last 24 Hrs  T(C): 36.9 (26 Feb 2019 02:33), Max: 36.9 (26 Feb 2019 02:33)  T(F): 98.4 (26 Feb 2019 02:33), Max: 98.4 (26 Feb 2019 02:33)  HR: 68 (26 Feb 2019 02:33) (68 - 96)  BP: 133/86 (26 Feb 2019 02:33) (106/64 - 133/86)  BP(mean): --  RR: 17 (26 Feb 2019 02:33) (16 - 18)  SpO2: 97% (26 Feb 2019 02:33) (97% - 100%)    Gen: NAD  Abd: + BS, soft, nontender, nondistended, no rebound or guarding  Incision clean, dry and intact  uterus firm at midline 2 fb below umbilicus   : lochia WNL  Extremities: no swelling or calf tenderness                          12.0   8.65  )-----------( 204      ( 25 Feb 2019 11:18 )             36.6     02-25    137  |  107  |  7   ----------------------------<  92  3.9   |  21<L>  |  0.68    Ca    9.2      25 Feb 2019 11:18    TPro  7.1  /  Alb  3.5  /  TBili  0.2  /  DBili  x   /  AST  14  /  ALT  19  /  AlkPhos  144<H>  02-25      PT/INR - ( 25 Feb 2019 11:18 )   PT: 10.5 sec;   INR: 0.93          PTT - ( 25 Feb 2019 11:18 )  PTT:26.5 sec    MEDICATIONS  (STANDING):  diphtheria/tetanus/pertussis (acellular) Vaccine (ADAcel) 0.5 milliLiter(s) IntraMuscular once  fentaNYL (2 MICROgram(s)/mL) + BUpivacaine 0.1% in 0.9% Sodium Chloride PCEA 250 milliLiter(s) Epidural PCA Continuous  ferrous    sulfate 325 milliGRAM(s) Oral daily  ibuprofen  Tablet. 600 milliGRAM(s) Oral every 6 hours  lactated ringers. 1000 milliLiter(s) (125 mL/Hr) IV Continuous <Continuous>  oxytocin Infusion 10 milliUNIT(s)/Min (10 mL/Hr) IV Continuous <Continuous>  oxytocin Infusion 41.667 milliUNIT(s)/Min (125 mL/Hr) IV Continuous <Continuous>  oxytocin Infusion 333.333 milliUNIT(s)/Min (1000 mL/Hr) IV Continuous <Continuous>  oxytocin Infusion 1 milliUNIT(s)/Min (1 mL/Hr) IV Continuous <Continuous>  prenatal multivitamin 1 Tablet(s) Oral daily    MEDICATIONS  (PRN):  diphenhydrAMINE 25 milliGRAM(s) Oral every 6 hours PRN Itching  docusate sodium 100 milliGRAM(s) Oral two times a day PRN Stool Softening  glycerin Suppository - Adult 1 Suppository(s) Rectal at bedtime PRN Constipation  lanolin Ointment 1 Application(s) Topical every 3 hours PRN Sore Nipples  oxyCODONE    5 mG/acetaminophen 325 mG 1 Tablet(s) Oral every 3 hours PRN Moderate Pain (4 - 6)  oxyCODONE    5 mG/acetaminophen 325 mG 2 Tablet(s) Oral every 6 hours PRN Severe Pain (7 - 10)  simethicone 80 milliGRAM(s) Chew every 4 hours PRN Gas

## 2019-02-26 NOTE — LACTATION INITIAL EVALUATION - INFANT FEEDING PLAN COMMENT, OB PROFILE
Triple Feeding Plan Care includes; offering the breast at each feeding time, limit nursing to 15 minutes each breast or 20-30 minutes total , especially for  babies. Providing supplemental milk in recommended volume according to age;Day 1, 2 -10 cc's, day 2, 5-15 cc's, day 3, 15 -30 cc's, day 4, 30-60cc's, at least 8 times a day (q3h). Pumping after nursing first or an attempt at nursing, to secure milk supply and provide expressed milk as a supplement.

## 2019-02-27 RX ADMIN — Medication 600 MILLIGRAM(S): at 13:32

## 2019-02-27 RX ADMIN — OXYCODONE AND ACETAMINOPHEN 1 TABLET(S): 5; 325 TABLET ORAL at 01:51

## 2019-02-27 RX ADMIN — Medication 600 MILLIGRAM(S): at 07:15

## 2019-02-27 RX ADMIN — Medication 600 MILLIGRAM(S): at 12:53

## 2019-02-27 RX ADMIN — Medication 100 MILLIGRAM(S): at 11:10

## 2019-02-27 RX ADMIN — Medication 650 MILLIGRAM(S): at 00:30

## 2019-02-27 RX ADMIN — Medication 650 MILLIGRAM(S): at 22:16

## 2019-02-27 RX ADMIN — Medication 600 MILLIGRAM(S): at 00:47

## 2019-02-27 RX ADMIN — Medication 650 MILLIGRAM(S): at 23:10

## 2019-02-27 RX ADMIN — Medication 325 MILLIGRAM(S): at 11:10

## 2019-02-27 RX ADMIN — OXYCODONE AND ACETAMINOPHEN 1 TABLET(S): 5; 325 TABLET ORAL at 11:53

## 2019-02-27 RX ADMIN — OXYCODONE AND ACETAMINOPHEN 1 TABLET(S): 5; 325 TABLET ORAL at 14:04

## 2019-02-27 RX ADMIN — Medication 1 TABLET(S): at 11:10

## 2019-02-27 RX ADMIN — OXYCODONE AND ACETAMINOPHEN 1 TABLET(S): 5; 325 TABLET ORAL at 02:30

## 2019-02-27 RX ADMIN — Medication 600 MILLIGRAM(S): at 01:50

## 2019-02-27 RX ADMIN — Medication 600 MILLIGRAM(S): at 18:22

## 2019-02-27 RX ADMIN — Medication 600 MILLIGRAM(S): at 19:12

## 2019-02-27 RX ADMIN — OXYCODONE AND ACETAMINOPHEN 1 TABLET(S): 5; 325 TABLET ORAL at 15:01

## 2019-02-27 RX ADMIN — Medication 650 MILLIGRAM(S): at 07:13

## 2019-02-27 RX ADMIN — Medication 650 MILLIGRAM(S): at 07:15

## 2019-02-27 RX ADMIN — OXYCODONE AND ACETAMINOPHEN 1 TABLET(S): 5; 325 TABLET ORAL at 11:10

## 2019-02-27 NOTE — PROGRESS NOTE ADULT - ASSESSMENT
38y Female POD#2    s/p C/S, Uncomplicated, stable                                   1. Neuro/Pain:  OPM  2  CV:  VS per routine  3. Pulm: Encourage ISS & Ambulation  4. GI:  Reg  5. : Voiding  6. DVT ppx: SCDs, SQH 5000 mg BID  7. Dispo: POD #3 or #4

## 2019-02-27 NOTE — PROGRESS NOTE ADULT - SUBJECTIVE AND OBJECTIVE BOX
Patient evaluated at bedside.   She reports pain is well controlled with motrin and tylenol   She has been ambulating without assistance, voiding spontaneously, passing gas, tolerating regular diet and is breastfeeding.    She denies HA, dizziness, CP, palpitations, SOB, n/v, or heavy vaginal bleeding.    Physical Exam:  Vital Signs Last 24 Hrs  T(C): 36.5 (27 Feb 2019 06:00), Max: 36.9 (26 Feb 2019 18:09)  T(F): 97.7 (27 Feb 2019 06:00), Max: 98.4 (26 Feb 2019 18:09)  HR: 83 (27 Feb 2019 06:00) (69 - 93)  BP: 97/60 (27 Feb 2019 06:00) (97/60 - 109/67)  BP(mean): --  RR: 18 (27 Feb 2019 06:00) (18 - 18)  SpO2: 100% (27 Feb 2019 06:00) (98% - 100%)    Gen: NAD  Abd: + BS, soft, nontender, nondistended, no rebound or guarding  Incision clean, dry and intact  uterus firm at midline 3 fb below umbilicus  : lochia WNL  Extremities: no swelling or calf tenderness                          12.0   8.65  )-----------( 204      ( 25 Feb 2019 11:18 )             36.6     02-25    137  |  107  |  7   ----------------------------<  92  3.9   |  21<L>  |  0.68    Ca    9.2      25 Feb 2019 11:18    TPro  7.1  /  Alb  3.5  /  TBili  0.2  /  DBili  x   /  AST  14  /  ALT  19  /  AlkPhos  144<H>  02-25      PT/INR - ( 25 Feb 2019 11:18 )   PT: 10.5 sec;   INR: 0.93          PTT - ( 25 Feb 2019 11:18 )  PTT:26.5 sec    MEDICATIONS  (STANDING):  acetaminophen   Tablet .. 650 milliGRAM(s) Oral every 6 hours  diphtheria/tetanus/pertussis (acellular) Vaccine (ADAcel) 0.5 milliLiter(s) IntraMuscular once  ferrous    sulfate 325 milliGRAM(s) Oral daily  ibuprofen  Tablet. 600 milliGRAM(s) Oral every 6 hours  lactated ringers. 1000 milliLiter(s) (125 mL/Hr) IV Continuous <Continuous>  oxytocin Infusion 10 milliUNIT(s)/Min (10 mL/Hr) IV Continuous <Continuous>  oxytocin Infusion 41.667 milliUNIT(s)/Min (125 mL/Hr) IV Continuous <Continuous>  oxytocin Infusion 333.333 milliUNIT(s)/Min (1000 mL/Hr) IV Continuous <Continuous>  oxytocin Infusion 1 milliUNIT(s)/Min (1 mL/Hr) IV Continuous <Continuous>  prenatal multivitamin 1 Tablet(s) Oral daily    MEDICATIONS  (PRN):  diphenhydrAMINE 25 milliGRAM(s) Oral every 6 hours PRN Itching  docusate sodium 100 milliGRAM(s) Oral two times a day PRN Stool Softening  glycerin Suppository - Adult 1 Suppository(s) Rectal at bedtime PRN Constipation  lanolin Ointment 1 Application(s) Topical every 3 hours PRN Sore Nipples  oxyCODONE    5 mG/acetaminophen 325 mG 1 Tablet(s) Oral every 3 hours PRN Moderate Pain (4 - 6)  oxyCODONE    5 mG/acetaminophen 325 mG 2 Tablet(s) Oral every 6 hours PRN Severe Pain (7 - 10)  simethicone 80 milliGRAM(s) Chew every 4 hours PRN Gas

## 2019-02-27 NOTE — PROGRESS NOTE ADULT - SUBJECTIVE AND OBJECTIVE BOX
OB/GYN ATTENDING POSTOP ROUNDS                                    Subjective:  38yFemale  Complaints: [x ]None	[ ]Other:      Objective:  		Vital Signs:  T(C): 36.5 (19 @ 06:00), Max: 36.9 (19 @ 18:09)  HR: 83 (19 @ 06:00) (69 - 93)  BP: 97/60 (19 @ 06:00) (97/60 - 109/67)  RR: 18 (19 @ 06:00) (18 - 18)  SpO2: 100% (19 @ 06:00) (98% - 100%)  Wt(kg): --     @ 07:01  -   @ 07:00  --------------------------------------------------------  IN: 1660 mL / OUT: 2050 mL / NET: -390 mL        		General:      NAD 	[x ] Yes 	[ ]No,	 A&O X3	[ x] Yes  [ ] No			  		Abdomen:   Palpation  	[x ]Normal	[ ]Other:	  	   Incision:              [x  ]Intact	   [ x] Clean	[x ] Dry    [   ]other:  BS		[ x]Normal 	[ ]Other:  			  LE:  	Calf tenderness    	[ x]None		[x ]No  Sign of DVT	[ ]Other:  		Lochia:	 		[ x]Normal,	[ ]Other:  		Labs:	                      12.0   8.65  )-----------( 204      ( 2019 11:18 )             36.6   	    137  |  107  |  7   ----------------------------<  92  3.9   |  21<L>  |  0.68    Ca    9.2      2019 11:18    TPro  7.1  /  Alb  3.5  /  TBili  0.2  /  DBili  x   /  AST  14  /  ALT  19  /  AlkPhos  144<H>       Assessment:  			[x ] Post-op  Section  			Day #__2_____  				[ ] Other:     	Plan:	1.  Supportive Care		[ ]Other:                                           2. Pain:		[ x] Well Controlled 	[ ] Other:	           	     	3. Misc.		[ x]Continue current care                                            4. Discharge home on FRIDAY if stable	[ ] Other:  		  Notes:			[x ] None			[ ] Other:      													Baltazar Rees MD [phone]

## 2019-02-28 RX ADMIN — Medication 650 MILLIGRAM(S): at 05:06

## 2019-02-28 RX ADMIN — Medication 600 MILLIGRAM(S): at 19:00

## 2019-02-28 RX ADMIN — Medication 600 MILLIGRAM(S): at 12:33

## 2019-02-28 RX ADMIN — Medication 1 TABLET(S): at 12:33

## 2019-02-28 RX ADMIN — OXYCODONE AND ACETAMINOPHEN 1 TABLET(S): 5; 325 TABLET ORAL at 12:30

## 2019-02-28 RX ADMIN — Medication 600 MILLIGRAM(S): at 07:25

## 2019-02-28 RX ADMIN — Medication 600 MILLIGRAM(S): at 18:18

## 2019-02-28 RX ADMIN — Medication 650 MILLIGRAM(S): at 15:40

## 2019-02-28 RX ADMIN — Medication 325 MILLIGRAM(S): at 12:33

## 2019-02-28 RX ADMIN — Medication 650 MILLIGRAM(S): at 15:00

## 2019-02-28 RX ADMIN — OXYCODONE AND ACETAMINOPHEN 1 TABLET(S): 5; 325 TABLET ORAL at 11:31

## 2019-02-28 RX ADMIN — Medication 600 MILLIGRAM(S): at 23:59

## 2019-02-28 RX ADMIN — Medication 650 MILLIGRAM(S): at 20:11

## 2019-02-28 RX ADMIN — Medication 650 MILLIGRAM(S): at 20:50

## 2019-02-28 RX ADMIN — Medication 600 MILLIGRAM(S): at 13:20

## 2019-02-28 RX ADMIN — Medication 600 MILLIGRAM(S): at 06:32

## 2019-02-28 RX ADMIN — Medication 650 MILLIGRAM(S): at 06:00

## 2019-02-28 NOTE — PROGRESS NOTE ADULT - ASSESSMENT
38y Female POD# 3   s/p C/S, Uncomplicated                                       1. Neuro/Pain:  OPM  2  CV:  VS per routine  3. Pulm: Encourage ISS & Ambulation  4. GI:  Reg  5. : Voiding  6. DVT ppx: SCDs, SQH 5000 mg BID  7. Dispo: POD #3 or #4 38y Female POD# 3   s/p C/S, Uncomplicated                                       1. Neuro/Pain:  OPM  2  CV:  VS per routine  3. Pulm: Encourage ISS & Ambulation  4. GI:  Reg  5. : Voiding  6. DVT ppx: SCDs, ambulating  7. Dispo: POD #3 or #4

## 2019-02-28 NOTE — PROGRESS NOTE ADULT - SUBJECTIVE AND OBJECTIVE BOX
Patient evaluated at bedside.   She reports pain is 7/10 this morning, due for both tylenol and motrin    She has been ambulating without assistance, voiding spontaneously, passing gas, tolerating regular diet and is breastfeeding.    She denies HA, dizziness, CP, palpitations, SOB, n/v, or heavy vaginal bleeding.    Physical Exam:  Vital Signs Last 24 Hrs  T(C): 36.8 (28 Feb 2019 06:00), Max: 37.1 (27 Feb 2019 09:20)  T(F): 98.3 (28 Feb 2019 06:00), Max: 98.8 (27 Feb 2019 09:20)  HR: 100 (28 Feb 2019 06:00) (76 - 100)  BP: 114/82 (28 Feb 2019 06:00) (101/65 - 121/82)  BP(mean): --  RR: 18 (28 Feb 2019 06:00) (18 - 18)  SpO2: 99% (28 Feb 2019 06:00) (99% - 100%)    Gen: NAD  Abd: + BS, soft, nontender, nondistended, no rebound or guarding  Incision clean, dry and intact 3fb below umbilicus   uterus firm at midline  : lochia WNL  Extremities: no swelling or calf tenderness          MEDICATIONS  (STANDING):  acetaminophen   Tablet .. 650 milliGRAM(s) Oral every 6 hours  diphtheria/tetanus/pertussis (acellular) Vaccine (ADAcel) 0.5 milliLiter(s) IntraMuscular once  ferrous    sulfate 325 milliGRAM(s) Oral daily  ibuprofen  Tablet. 600 milliGRAM(s) Oral every 6 hours  lactated ringers. 1000 milliLiter(s) (125 mL/Hr) IV Continuous <Continuous>  oxytocin Infusion 10 milliUNIT(s)/Min (10 mL/Hr) IV Continuous <Continuous>  oxytocin Infusion 41.667 milliUNIT(s)/Min (125 mL/Hr) IV Continuous <Continuous>  oxytocin Infusion 333.333 milliUNIT(s)/Min (1000 mL/Hr) IV Continuous <Continuous>  oxytocin Infusion 1 milliUNIT(s)/Min (1 mL/Hr) IV Continuous <Continuous>  prenatal multivitamin 1 Tablet(s) Oral daily    MEDICATIONS  (PRN):  diphenhydrAMINE 25 milliGRAM(s) Oral every 6 hours PRN Itching  docusate sodium 100 milliGRAM(s) Oral two times a day PRN Stool Softening  glycerin Suppository - Adult 1 Suppository(s) Rectal at bedtime PRN Constipation  lanolin Ointment 1 Application(s) Topical every 3 hours PRN Sore Nipples  oxyCODONE    5 mG/acetaminophen 325 mG 1 Tablet(s) Oral every 3 hours PRN Moderate Pain (4 - 6)  oxyCODONE    5 mG/acetaminophen 325 mG 2 Tablet(s) Oral every 6 hours PRN Severe Pain (7 - 10)  simethicone 80 milliGRAM(s) Chew every 4 hours PRN Gas

## 2019-03-01 VITALS
DIASTOLIC BLOOD PRESSURE: 75 MMHG | TEMPERATURE: 97 F | SYSTOLIC BLOOD PRESSURE: 114 MMHG | HEART RATE: 83 BPM | OXYGEN SATURATION: 100 % | RESPIRATION RATE: 18 BRPM

## 2019-03-01 LAB — SURGICAL PATHOLOGY STUDY: SIGNIFICANT CHANGE UP

## 2019-03-01 PROCEDURE — 80053 COMPREHEN METABOLIC PANEL: CPT

## 2019-03-01 PROCEDURE — 88307 TISSUE EXAM BY PATHOLOGIST: CPT

## 2019-03-01 PROCEDURE — 81001 URINALYSIS AUTO W/SCOPE: CPT

## 2019-03-01 PROCEDURE — 84156 ASSAY OF PROTEIN URINE: CPT

## 2019-03-01 PROCEDURE — 36415 COLL VENOUS BLD VENIPUNCTURE: CPT

## 2019-03-01 PROCEDURE — 85384 FIBRINOGEN ACTIVITY: CPT

## 2019-03-01 PROCEDURE — 86900 BLOOD TYPING SEROLOGIC ABO: CPT

## 2019-03-01 PROCEDURE — 84550 ASSAY OF BLOOD/URIC ACID: CPT

## 2019-03-01 PROCEDURE — 85610 PROTHROMBIN TIME: CPT

## 2019-03-01 PROCEDURE — 99214 OFFICE O/P EST MOD 30 MIN: CPT

## 2019-03-01 PROCEDURE — 82570 ASSAY OF URINE CREATININE: CPT

## 2019-03-01 PROCEDURE — 85025 COMPLETE CBC W/AUTO DIFF WBC: CPT

## 2019-03-01 PROCEDURE — 85730 THROMBOPLASTIN TIME PARTIAL: CPT

## 2019-03-01 PROCEDURE — 83615 LACTATE (LD) (LDH) ENZYME: CPT

## 2019-03-01 PROCEDURE — 86901 BLOOD TYPING SEROLOGIC RH(D): CPT

## 2019-03-01 PROCEDURE — 86850 RBC ANTIBODY SCREEN: CPT

## 2019-03-01 RX ADMIN — Medication 650 MILLIGRAM(S): at 10:02

## 2019-03-01 RX ADMIN — Medication 650 MILLIGRAM(S): at 04:00

## 2019-03-01 RX ADMIN — Medication 600 MILLIGRAM(S): at 00:56

## 2019-03-01 RX ADMIN — Medication 600 MILLIGRAM(S): at 07:40

## 2019-03-01 RX ADMIN — Medication 650 MILLIGRAM(S): at 03:16

## 2019-03-01 RX ADMIN — Medication 600 MILLIGRAM(S): at 06:41

## 2019-03-01 RX ADMIN — Medication 650 MILLIGRAM(S): at 09:17

## 2019-03-05 DIAGNOSIS — O36.5930 MATERNAL CARE FOR OTHER KNOWN OR SUSPECTED POOR FETAL GROWTH, THIRD TRIMESTER, NOT APPLICABLE OR UNSPECIFIED: ICD-10-CM

## 2019-03-05 DIAGNOSIS — Z3A.36 36 WEEKS GESTATION OF PREGNANCY: ICD-10-CM

## 2019-03-05 DIAGNOSIS — O36.8330 MATERNAL CARE FOR ABNORMALITIES OF THE FETAL HEART RATE OR RHYTHM, THIRD TRIMESTER, NOT APPLICABLE OR UNSPECIFIED: ICD-10-CM

## 2019-03-05 DIAGNOSIS — O36.8930 MATERNAL CARE FOR OTHER SPECIFIED FETAL PROBLEMS, THIRD TRIMESTER, NOT APPLICABLE OR UNSPECIFIED: ICD-10-CM

## 2019-03-05 DIAGNOSIS — O34.43 MATERNAL CARE FOR OTHER ABNORMALITIES OF CERVIX, THIRD TRIMESTER: ICD-10-CM

## 2019-03-05 DIAGNOSIS — O26.873 CERVICAL SHORTENING, THIRD TRIMESTER: ICD-10-CM

## 2019-03-05 DIAGNOSIS — O36.5130 MATERNAL CARE FOR KNOWN OR SUSPECTED PLACENTAL INSUFFICIENCY, THIRD TRIMESTER, NOT APPLICABLE OR UNSPECIFIED: ICD-10-CM

## 2019-03-05 DIAGNOSIS — N88.8 OTHER SPECIFIED NONINFLAMMATORY DISORDERS OF CERVIX UTERI: ICD-10-CM

## 2020-09-02 ENCOUNTER — HOSPITAL ENCOUNTER (INPATIENT)
Dept: HOSPITAL 74 - JLDR | Age: 40
LOS: 2 days | Discharge: HOME | End: 2020-09-04
Attending: OBSTETRICS & GYNECOLOGY | Admitting: OBSTETRICS & GYNECOLOGY
Payer: COMMERCIAL

## 2020-09-02 VITALS — BODY MASS INDEX: 25.4 KG/M2

## 2020-09-02 DIAGNOSIS — D64.9: ICD-10-CM

## 2020-09-02 DIAGNOSIS — Z3A.37: ICD-10-CM

## 2020-09-02 DIAGNOSIS — O34.219: ICD-10-CM

## 2020-09-02 DIAGNOSIS — Z30.2: ICD-10-CM

## 2020-09-02 LAB
ANION GAP SERPL CALC-SCNC: 7 MMOL/L (ref 8–16)
APTT BLD: 26 SECONDS (ref 25.2–36.5)
BASE EXCESS BLDCOA CALC-SCNC: -2.8 MMOL/L (ref 0–2)
BASOPHILS # BLD: 0.3 % (ref 0–2)
BUN SERPL-MCNC: 5 MG/DL (ref 7–18)
CALCIUM SERPL-MCNC: 8.3 MG/DL (ref 8.5–10.1)
CHLORIDE SERPL-SCNC: 108 MMOL/L (ref 98–107)
CO2 SERPL-SCNC: 26 MMOL/L (ref 21–32)
CREAT SERPL-MCNC: 0.6 MG/DL (ref 0.55–1.3)
DEPRECATED RDW RBC AUTO: 13.8 % (ref 11.6–15.6)
EOSINOPHIL # BLD: 0.4 % (ref 0–4.5)
GLUCOSE SERPL-MCNC: 71 MG/DL (ref 74–106)
HCO3 BLDCO-SCNC: 21.1 MMHG (ref 20–29)
HCO3 BLDCO-SCNC: 23.9 MMHG (ref 20–29)
HCT VFR BLD CALC: 31.7 % (ref 32.4–45.2)
HGB BLD-MCNC: 10.5 GM/DL (ref 10.7–15.3)
INR BLD: 0.93 (ref 0.83–1.09)
LYMPHOCYTES # BLD: 17.4 % (ref 8–40)
MCH RBC QN AUTO: 30.1 PG (ref 25.7–33.7)
MCHC RBC AUTO-ENTMCNC: 33.3 G/DL (ref 32–36)
MCV RBC: 90.4 FL (ref 80–96)
MONOCYTES # BLD AUTO: 9.4 % (ref 3.8–10.2)
NEUTROPHILS # BLD: 72.5 % (ref 42.8–82.8)
PCO2 BLDCO: 34.3 MMHG (ref 30–78)
PCO2 BLDCO: 49.4 MMHG (ref 30–78)
PH BLDCO: 7.3 [PH] (ref 7.14–7.44)
PH BLDCO: 7.41 [PH] (ref 7.14–7.44)
PLATELET # BLD AUTO: 214 K/MM3 (ref 134–434)
PMV BLD: 8.3 FL (ref 7.5–11.1)
POTASSIUM SERPLBLD-SCNC: 3.9 MMOL/L (ref 3.5–5.1)
PT PNL PPP: 11 SEC (ref 9.7–13)
RBC # BLD AUTO: 3.5 M/MM3 (ref 3.6–5.2)
SODIUM SERPL-SCNC: 140 MMOL/L (ref 136–145)
WBC # BLD AUTO: 8.4 K/MM3 (ref 4–10)

## 2020-09-02 PROCEDURE — U0003 INFECTIOUS AGENT DETECTION BY NUCLEIC ACID (DNA OR RNA); SEVERE ACUTE RESPIRATORY SYNDROME CORONAVIRUS 2 (SARS-COV-2) (CORONAVIRUS DISEASE [COVID-19]), AMPLIFIED PROBE TECHNIQUE, MAKING USE OF HIGH THROUGHPUT TECHNOLOGIES AS DESCRIBED BY CMS-2020-01-R: HCPCS

## 2020-09-02 PROCEDURE — 0UL70ZZ OCCLUSION OF BILATERAL FALLOPIAN TUBES, OPEN APPROACH: ICD-10-PCS | Performed by: OBSTETRICS & GYNECOLOGY

## 2020-09-02 RX ADMIN — SODIUM CHLORIDE, SODIUM GLUCONATE, SODIUM ACETATE, POTASSIUM CHLORIDE, AND MAGNESIUM CHLORIDE SCH MLS/HR: 526; 502; 368; 37; 30 INJECTION, SOLUTION INTRAVENOUS at 15:30

## 2020-09-02 RX ADMIN — SODIUM CHLORIDE, SODIUM GLUCONATE, SODIUM ACETATE, POTASSIUM CHLORIDE, AND MAGNESIUM CHLORIDE SCH MLS/HR: 526; 502; 368; 37; 30 INJECTION, SOLUTION INTRAVENOUS at 16:20

## 2020-09-02 RX ADMIN — DOCUSATE SODIUM,SENNOSIDES SCH: 50; 8.6 TABLET, FILM COATED ORAL at 22:06

## 2020-09-02 RX ADMIN — SIMETHICONE CHEW TAB 80 MG SCH: 80 TABLET ORAL at 21:49

## 2020-09-02 NOTE — HP
Past Medical History





- Primary Care Physician


PCP:: Keke Benjamin





- Admission


Chief Complaint: leaking AF.  UC q 3 min


History of Present Illness: 





39 yo  EDC 2020 EGA 37 weeks leaking clear fluid, breech 

presentation in labor.


History Source: Patient


Limitations to Obtaining History: No Limitations





- Past Medical History


...: 6


...Para: 1


...: 1


...Induced : 4


... Weeks Gestation by Dates: 37


...EDC by Dates: 20


Heme/Onc: Yes: Anemia





- Past Surgical History


Past Surgical History: Yes: 


Hx Myomectomy: No


Hx Transabdominal Cerclage: No





- Smoking History


Smoking history: Never smoked


Have you smoked in the past 12 months: No





- Alcohol/Substance Use


Hx Alcohol Use: No


History of Substance Use: reports: None





- Social History


Usual Living Arrangement: Yes: With Spouse


History of Recent Travel: No





Home Medications





- Allergies


Allergies/Adverse Reactions: 


                                    Allergies











Allergy/AdvReac Type Severity Reaction Status Date / Time


 


No Known Allergies Allergy   Verified 20 15:14














Family Medical History


Family Hx Cardiac Disorders: Father





Review of Systems





- Review of Systems


Constitutional: reports: No Symptoms


Eyes: reports: No Symptoms


HENT: reports: No Symptoms


Neck: reports: No Symptoms


Cardiovascular: reports: No Symptoms


Respiratory: reports: No Symptoms


Gastrointestinal: reports: No Symptoms


Genitourinary: reports: No Symptoms


Breasts: reports: No Symptoms Reported


Musculoskeletal: reports: No Symptoms


Integumentary: reports: No Symptoms


Neurological: reports: No Symptoms


Endocrine: reports: No Symptoms


Hematology/Lymphatic: reports: No Symptoms


Psychiatric: reports: No Symptoms





Physical Exam - Maternity


Constitutional: Yes: Well Nourished, No Distress


Eyes: Yes: WNL


HENT: Yes: WNL


Neck: Yes: WNL


Cardiovascular: Yes: WNL


Lungs: Clear to auscultation


Breast(s): Yes: WNL





- Abdominal Exam/OB


Number of Fetuses: Single


Fetal Presentation: Breech


Contractions: Yes


Regularity: Regular


Intensity: Moderate


Fetal Monitor Mode: External


Fetal Heart Rate Location: [facility]


Category: I


Accelerations: Uniform


Decelerations: None





- Vaginal Exam/OB


Vaginal Bleeding: No


Dilatation (cm): 2


Effacement (%): 100


Amniotic Membrane Status: Leaking


Amniotic Fluid: Yes: Clear


Fetal Presentation: Lopez Breech


Fetal Station: +1





- Physical Exam


Musculoskeletal: Yes: WNL


Extremities: Yes: WNL


Edema: No


Integumentary: Yes: WNL


...Motor Strength: WNL


Psychiatric: Yes: WNL





Hemorrhage Risk Assessment





- Risk Factors


Medium Risk Factors: Yes: Prior , uterine surgery,or multiple 

laparotomies


Risk Score: 1


Risk Level: Medium Risk





Problem List





- Problems


(1) AMA (advanced maternal age) multigravida 35+


Code(s): O09.529 - SUPERVISION OF ELDERLY MULTIGRAVIDA, UNSPECIFIED TRIMESTER   





(2) Previous  delivery affecting pregnancy, antepartum


Code(s): O34.219 - MATERNAL CARE FOR UNSP TYPE SCAR FROM PREVIOUS  DEL  







(3)  premature rupture of membranes (PPROM) with onset of labor after 24 

hours of rupture in first trimester, antepartum


Code(s): O42.111 - PRETRM NOAM ROM, ONSET LABOR > 24 HOURS FOL RUPT, FIRST TRI  







(4) 37 weeks gestation of pregnancy


Code(s): Z3A.37 - 37 WEEKS GESTATION OF PREGNANCY   





Assessment/Plan





Admit to LD


Cesarian delivery

## 2020-09-02 NOTE — OP
Operative Note





- Note:


Operative Date: 20


Pre-Operative Diagnosis: 39 yo  @ 37 weeks.  AMA, PPROM, previous c/s in

labor, breech presentation


Operation: Rpt c/s x 2 with BTL via LTCS via pfannenstiel incision


Findings: 





Baby girl CAN x 1 borb APGAR 9/9


Cord gases and blood collected


Placenta and membranes complete


Surgeon: Keke Benjamin


Assistant: Denice Tapia


Anesthesiologist/CRNA: Oswaldo Harden


Anesthesia: Spinal


Estimated Blood Loss (mls): 800


Fluid Volume Replaced (mls): 1,000


Operative Report Dictated: No

## 2020-09-03 LAB
BASOPHILS # BLD: 0.2 % (ref 0–2)
DEPRECATED RDW RBC AUTO: 13.3 % (ref 11.6–15.6)
EOSINOPHIL # BLD: 0.3 % (ref 0–4.5)
HCT VFR BLD CALC: 32.8 % (ref 32.4–45.2)
HGB BLD-MCNC: 10.8 GM/DL (ref 10.7–15.3)
LYMPHOCYTES # BLD: 8 % (ref 8–40)
MCH RBC QN AUTO: 29.8 PG (ref 25.7–33.7)
MCHC RBC AUTO-ENTMCNC: 32.9 G/DL (ref 32–36)
MCV RBC: 90.6 FL (ref 80–96)
MONOCYTES # BLD AUTO: 8.5 % (ref 3.8–10.2)
NEUTROPHILS # BLD: 83 % (ref 42.8–82.8)
PLATELET # BLD AUTO: 215 K/MM3 (ref 134–434)
PMV BLD: 8.2 FL (ref 7.5–11.1)
RBC # BLD AUTO: 3.62 M/MM3 (ref 3.6–5.2)
WBC # BLD AUTO: 15.4 K/MM3 (ref 4–10)

## 2020-09-03 RX ADMIN — MAGNESIUM HYDROXIDE SCH ML: 400 SUSPENSION ORAL at 10:47

## 2020-09-03 RX ADMIN — ACETAMINOPHEN PRN MG: 325 TABLET ORAL at 20:19

## 2020-09-03 RX ADMIN — ENOXAPARIN SODIUM SCH MG: 40 INJECTION SUBCUTANEOUS at 10:47

## 2020-09-03 RX ADMIN — CEFAZOLIN SODIUM SCH MLS/HR: 1 INJECTION, SOLUTION INTRAVENOUS at 00:54

## 2020-09-03 RX ADMIN — IBUPROFEN PRN MG: 600 TABLET, FILM COATED ORAL at 20:20

## 2020-09-03 RX ADMIN — CEFAZOLIN SODIUM SCH MLS/HR: 1 INJECTION, SOLUTION INTRAVENOUS at 16:38

## 2020-09-03 RX ADMIN — IBUPROFEN PRN MG: 600 TABLET, FILM COATED ORAL at 15:45

## 2020-09-03 RX ADMIN — SIMETHICONE CHEW TAB 80 MG SCH MG: 80 TABLET ORAL at 20:30

## 2020-09-03 RX ADMIN — SIMETHICONE CHEW TAB 80 MG SCH MG: 80 TABLET ORAL at 22:00

## 2020-09-03 RX ADMIN — SIMETHICONE CHEW TAB 80 MG SCH MG: 80 TABLET ORAL at 15:46

## 2020-09-03 RX ADMIN — SIMETHICONE CHEW TAB 80 MG SCH: 80 TABLET ORAL at 01:51

## 2020-09-03 RX ADMIN — SIMETHICONE CHEW TAB 80 MG SCH: 80 TABLET ORAL at 06:10

## 2020-09-03 RX ADMIN — SIMETHICONE CHEW TAB 80 MG SCH MG: 80 TABLET ORAL at 10:48

## 2020-09-03 RX ADMIN — DOCUSATE SODIUM,SENNOSIDES SCH TABLET: 50; 8.6 TABLET, FILM COATED ORAL at 20:21

## 2020-09-03 RX ADMIN — ACETAMINOPHEN PRN MG: 325 TABLET ORAL at 15:45

## 2020-09-03 RX ADMIN — CEFAZOLIN SODIUM SCH MLS/HR: 1 INJECTION, SOLUTION INTRAVENOUS at 08:51

## 2020-09-03 NOTE — PN
Post Partum Progress Note


Type of Delivery: Repeat C/S


Vital Signs: 


                                   Vital Signs











Temperature  98.4 F   20 15:33


 


Pulse Rate  94 H  20 15:33


 


Respiratory Rate  18   20 15:33


 


Blood Pressure  117/67   20 15:33


 


O2 Sat by Pulse Oximetry (%)  100   20 18:30











Breast Exam: Yes: Soft


Uterus: Yes: Fundus above umbilicus


Incision: Yes: Dressing dry and intact


Abdomen/GI: Yes: Abdomen soft, Abdominal Distention, Tender


Lochia: Yes: Rubra


Lochia, amount: Small


Extremities: Yes: Calves non-tender


Activity: Ambulating





- Labs


Labs: 


                                       CBC











WBC  15.4 K/mm3 (4.0-10.0)  H  20  08:34    


 


RBC  3.62 M/mm3 (3.60-5.2)   20  08:34    


 


Hgb  10.8 GM/dL (10.7-15.3)   20  08:34    


 


Hct  32.8 % (32.4-45.2)   20  08:34    


 


MCV  90.6 fl (80-96)   20  08:34    


 


MCH  29.8 pg (25.7-33.7)   20  08:34    


 


MCHC  32.9 g/dl (32.0-36.0)   20  08:34    


 


RDW  13.3 % (11.6-15.6)   20  08:34    


 


Plt Count  215 K/MM3 (134-434)   20  08:34    


 


MPV  8.2 fl (7.5-11.1)   20  08:34    


 


Absolute Neuts (auto)  12.8 K/mm3 (1.5-8.0)  H  20  08:34    


 


Neutrophils %  83.0 % (42.8-82.8)  H  20  08:34    


 


Lymphocytes %  8.0 % (8-40)  D 20  08:34    


 


Monocytes %  8.5 % (3.8-10.2)   20  08:34    


 


Eosinophils %  0.3 % (0-4.5)   20  08:34    


 


Basophils %  0.2 % (0-2.0)   20  08:34    


 


Nucleated RBC %  0 % (0-0)   20  08:34    














Problem List





- Problems


(1) AMA (advanced maternal age) multigravida 35+


Code(s): O09.529 - SUPERVISION OF ELDERLY MULTIGRAVIDA, UNSPECIFIED TRIMESTER   





(2) Previous  delivery affecting pregnancy, antepartum


Code(s): O34.219 - MATERNAL CARE FOR UNSP TYPE SCAR FROM PREVIOUS  DEL  







(3)  premature rupture of membranes (PPROM) with onset of labor after 24 

hours of rupture in first trimester, antepartum


Code(s): O42.111 - PRETRM NOAM ROM, ONSET LABOR > 24 HOURS FOL RUPT, FIRST TRI  







(4) 37 weeks gestation of pregnancy


Code(s): Z3A.37 - 37 WEEKS GESTATION OF PREGNANCY   





Assessment/Plan





ambulating


breastfeeding

## 2020-09-03 NOTE — PN
Progress Note (short form)





- Note


Progress Note: 





Anesthesia POD#1


S/P  under spinal and DM


VSS, nausea yesterday no N/N today,pain is under control.


Legs fully recovered.





Kay Goldman MD.

## 2020-09-04 VITALS — HEART RATE: 63 BPM | TEMPERATURE: 98 F | SYSTOLIC BLOOD PRESSURE: 99 MMHG | DIASTOLIC BLOOD PRESSURE: 64 MMHG

## 2020-09-04 RX ADMIN — ACETAMINOPHEN PRN MG: 325 TABLET ORAL at 12:50

## 2020-09-04 RX ADMIN — SIMETHICONE CHEW TAB 80 MG SCH MG: 80 TABLET ORAL at 05:00

## 2020-09-04 RX ADMIN — SIMETHICONE CHEW TAB 80 MG SCH MG: 80 TABLET ORAL at 09:19

## 2020-09-04 RX ADMIN — DOCUSATE SODIUM,SENNOSIDES SCH: 50; 8.6 TABLET, FILM COATED ORAL at 01:41

## 2020-09-04 RX ADMIN — SIMETHICONE CHEW TAB 80 MG SCH MG: 80 TABLET ORAL at 12:52

## 2020-09-04 RX ADMIN — ENOXAPARIN SODIUM SCH MG: 40 INJECTION SUBCUTANEOUS at 09:17

## 2020-09-04 RX ADMIN — IBUPROFEN PRN MG: 600 TABLET, FILM COATED ORAL at 12:51

## 2020-09-04 RX ADMIN — MAGNESIUM HYDROXIDE SCH ML: 400 SUSPENSION ORAL at 09:17

## 2020-09-04 RX ADMIN — IBUPROFEN PRN MG: 600 TABLET, FILM COATED ORAL at 05:00

## 2020-09-04 RX ADMIN — SIMETHICONE CHEW TAB 80 MG SCH: 80 TABLET ORAL at 02:22

## 2020-09-04 RX ADMIN — SIMETHICONE CHEW TAB 80 MG SCH: 80 TABLET ORAL at 03:38

## 2020-09-04 NOTE — DS
Physical Exam-GYN


Vital Signs: 


                                   Vital Signs











Temperature  98.0 F   20 10:00


 


Pulse Rate  63   20 10:00


 


Respiratory Rate  18   20 10:00


 


Blood Pressure  99/64   20 10:00


 


O2 Sat by Pulse Oximetry (%)  100   20 18:30











Labs: 


                                    CBC, BMP





                                 20 08:34 





                                 20 15:30 











Delivery





- Delivery


Type of Anesthesia: Spinal


Episiotomy/Laceration: None


EBL (cc): 800





Delivery, Single Birth





- Stages of Labor


Date 1st Stage Initiatied: 20


Time 1st Stage Initiated: 11:00


Date of Delivery: 20


Time of Delivery: 16:40


Time Placenta Delivered: 16:41





- Condition of Infant


Pediatrician/Neonatologist Present: Yes


Name: Dori Ace


Infant Gender: Female


Birth Weight: 2.637 kg


Total Hours ROM (Hrs/Mins): 5/41





- Apgar


  ** 1 Minute


Apgar Total Score: 9





  ** 5 Minutes


Apgar Total Score: 9





- Allison Feeding Plan


Initial Plan: Exclusive breastfeeding throughout hospitalization





Remarks





- Remarks


Remarks: 





asked by Dr. Benjamin to see and discharge this patient (if she wishes to go home 

today) since she is unable to come see her at this time.


pt. without complaints.  +flatus.  tolerating diet, ambulating well


vss - af 


abd: soft, nt,nd. +bs


dressing c/d/i


ve: min lochia


ext: no calf tenderness b/l


a/p pod 2 s/p  delivery for breech in labor


pt doing well


will shower now and remove dressing in shower.


nurse will inspect prior to discharge and contact MD if any issue.


pt. requests to be discharged to home today.


Rxs sent 


instructed as to proper wound care and f/u w Dr. Benjamin next week for wound check.


d/w Dr. Benjamin





Discharge Summary


Problems reviewed: Yes


Reason For Visit: C SECTION BREECH


Current Active Problems





37 weeks gestation of pregnancy (Acute)


AMA (advanced maternal age) multigravida 35+ (Acute)


 premature rupture of membranes (PPROM) with onset of labor after 24 

hours of rupture in first trimester, antepartum (Acute)


Previous  delivery affecting pregnancy, antepartum (Acute)








Procedures: Principal: 


Condition: Good





- Instructions


Diet, Activity, Other Instructions: 


regular diet,


activity as tolerated, but avoid strenuous activity , heavy lifting or 

intercourse.


wound care: wash daily with soap and water, rinse well, pat dry and then keep 

clean, dry and open to air.


Referrals: 


Keke Benjamin MD [Staff Physician] - 


Disposition: HOME





- Home Medications


Comprehensive Discharge Medication List: 


Ambulatory Orders





Acetaminophen [Tylenol .Regular Strength -] 650 mg PO Q6H PRN #40 tablet 

20 


Ibuprofen [Motrin -] 600 mg PO Q6H PRN #50 tablet 20

## 2020-09-04 NOTE — PATH
Surgical Pathology Report



Patient Name:  VAZQUEZ, APRIL

Accession #:  [id number]

[facility]. Rec. #:  [id number]                                                        

   /Age/Gender:  1980 (Age: 40) / F

Account:  [id number]                                                          

             Location: [facility] OBS/GYN

Taken:  2020

Received:  9/3/2020

Reported:  2020

Physicians:  Keke Benjamin M.D.

  



Specimen(s) Received

A: PLACENTA 

B: LEFT FALLOPIAN TUBE 

C: RIGHT FALLOPIAN TUBE 





Clinical History

, repeat 







Final Diagnosis

A. PLACENTA:

THIRD TRIMESTER PLACENTA.

TRIVASCULAR CORD.

MEMBRANES WITH NO DIAGNOSTIC ABNORMALITIES.



B. LEFT PORTION OF FALLOPIAN TUBE, RESECTION:

COMPLETE CROSS SECTION OF THE FALLOPIAN TUBE LUMEN IDENTIFIED.



C. RIGHT PORTION OF FALLOPIAN TUBE, RESECTION:

COMPLETE CROSS SECTION OF THE FALLOPIAN TUBE LUMEN IDENTIFIED.





***Electronically Signed***

Fabiana Peterson M.D.





Gross Description

A.  The specimen is received fresh labeled placenta and is a 381 gram, 18.0 x

15.5 x 2.0 cm. placenta with attached membranes and umbilical cord.  The

attached membranes are tan, translucent with focal opacities and insert

marginally.  The umbilical cord measures 14 cm. in length and averages 1 cm. in

diameter.  The cord inserts eccentrically, 4.5 cm. to the nearest margin.  No

true knots or strictures are identified. Cut surface of the umbilical cord

reveals 3 vessels. The fetal surface is grey-blue with minimal fibrin deposition

and appropriate caliber vessels.  The maternal surface is red-brown with focal

defects.  Sectioning reveals red-brown, spongy parenchyma.  No lesions are

identified.  Representative sections are submitted in three cassettes as

follows: 1- membrane rolls and umbilical cord; 2-3- full thickness sections of

placenta.



B. Received in formalin labeled "portion left tube," is a 1.0 cm in length

portion of fallopian tube. No fimbria are present. The outer surface is tan-pink

and smooth. Sectioning reveals an unremarkable lumen. Representative sections

are submitted in one cassette.



C. Received in formalin labeled "portion of right tube," is a 1.1 cm in length

portion of fallopian tube. No fimbria are present. The outer surface is tan-pink

and smooth. Sectioning reveals an unremarkable lumen. Representative sections

are submitted in one cassette.

DL/9/3/2020



saudi/9/3/2020

## 2020-09-15 NOTE — OP
DATE OF OPERATION:  2020

 

PREOPERATIVE DIAGNOSIS:  40-year-old G6, para 0-1-4-1 at 37 weeks, advanced maternal

age,  premature rupture of membranes, previous  section in labor,

breech presentation.

 

PROCEDURE:  Repeat  section x2 with bilateral tubal ligation via Pfannenstiel

incision.

 

SURGEON:  Keke Klein MD 

 

ASSISTANT:   _____ 

 

ANESTHESIA:  Oswaldo Harden MD, spinal.

 

ESTIMATED BLOOD LOSS:  800 mL fluid, 1000 mL lactated Ringer's.

 

URINE OUTPUT:  300 mL at the end of the procedure.

 

FINDINGS:  Baby girl found.  Cord around the neck x1.  Apgar 9, 9.  Cord gases and

blood collected.

 

DESCRIPTION OF PROCEDURE:  The patient was taken to the operating room where spinal

anesthesia was found to be adequate.  She was prepped and draped in the usual sterile

fashion in dorsal supine position with a leftward tilt.  A Pfannenstiel skin incision

was made with a scalpel and carried through to the underlying layer of the fascia

with Bovie.  The fascia was incised in the midline and the incision extended

laterally with Delgado scissors.  The superior and inferior aspect of fascial incision

was grasped with Kocher clamps, elevated, and the underlying rectus muscles dissected

off bluntly.

 

Attention was then turned to the rectus muscles, which were  in the midline.

 Peritoneum visualized, grasped with pickups, and entered sharply with Metzenbaum

scissors.  The peritoneal incision was extended superiorly and inferiorly with good

visualization of the bladder.  The bladder blade was then re-inserted.  The

vesicouterine peritoneum identified, grasped with pickups, and entered sharply with

Metzenbaum scissors.  The incision was extended laterally and the bladder flap

created digitally.  The bladder blade was then re-inserted and the lower uterine

segment incised in transverse fashion with a scalpel.  The uterine incision was then

extended laterally with bandage scissors.  The bladder blade was removed, and the

infant was delivered atraumatically as breech.  Nose and mouth were suctioned and the

cord clamped and cut.  The infant was handed off to the awaiting pediatrician.  Cord

gases and blood were collected.  Placenta was then removed manually.  The uterus

exteriorized and cleared of all clot and debris.  Uterine incision was repaired with

1-0 chromic in a running, locking fashion.  Second layer of the same suture was used

to obtain hemostasis.  Bladder flap was repaired with 2-0 chromic, and attention was

then turned to the fallopian tubes, which were ligated, dissected, and cauterized

bilaterally without bleeding noted.  The gutters were cleared of all clots and the

uterus returned to the abdomen.  The peritoneum closed with 2-0 chromic.  The fascia

was reapproximate with 0 Vicryl in a running fashion, and the skin was closed with

staples.  The patient tolerated the procedure well.  Sponge, lap, and needle count

were correct x2.  The patient was taken to the recovery room in stable condition.

 

 

KEKE KLEIN MD

 

[id number]

DD: 09/15/2020 13:48

DT: 09/15/2020 14:05

Job #:  [id number]

## 2022-01-14 NOTE — PATIENT PROFILE OB - TERM DELIVERIES, OB PROFILE
Call to patient and given MD message below.    Patient has Podiatry appointnent 2/7.  Advised needs to see Ortho.    Patient will call during his lunch for [facility] ([phone]) and was advised to call that number and ask for Orthopedic dept.    Patient verbalized understanding to information given and had no further questions or concerns.    Awaiting callback.   0

## 2022-06-27 NOTE — PATIENT PROFILE OB - NS PRO ABUSE SCREEN SUSPICION NEGLECT YN
Render Post-Care Instructions In Note?: yes Detail Level: Simple Number Of Freeze-Thaw Cycles: 2 freeze-thaw cycles Post-Care Instructions: I reviewed with the patient in detail post-care instructions. Patient is to wear sunprotection, and avoid picking at any of the treated lesions. Pt may apply Vaseline to crusted or scabbing areas. Consent: The patient's consent was obtained including but not limited to risks of crusting, scabbing, blistering, scarring, darker or lighter pigmentary change, recurrence, incomplete removal and infection. Duration Of Freeze Thaw-Cycle (Seconds): 7 Render Note In Bullet Format When Appropriate: No no

## 2024-07-09 NOTE — PATIENT PROFILE OB - ARE SIGNIFICANT INDICATORS COMPLETE.
From: Gisele Ahn  To: Gordy Lomax MD  Sent: 7/8/2024 3:29 PM CDT  Subject: Persistent cough    Gisele's sister had the same cough for several months. Her MD prescribed her Qvari inhaler for 90days. It helped her tremendously. Wondering if this would be an option?   Thank you   Yes